# Patient Record
Sex: MALE | Race: WHITE | Employment: OTHER | URBAN - METROPOLITAN AREA
[De-identification: names, ages, dates, MRNs, and addresses within clinical notes are randomized per-mention and may not be internally consistent; named-entity substitution may affect disease eponyms.]

---

## 2019-02-28 LAB
CHOLESTEROL, TOTAL: 152 MG/DL
CHOLESTEROL/HDL RATIO: 0.92
CREATININE: 1 MG/DL
HDLC SERPL-MCNC: 72 MG/DL (ref 35–70)
LDL CHOLESTEROL CALCULATED: 66 MG/DL (ref 0–160)
POTASSIUM (K+): 3.8
TRIGL SERPL-MCNC: 72 MG/DL
VLDLC SERPL CALC-MCNC: ABNORMAL MG/DL

## 2019-05-31 LAB
CHOLESTEROL, TOTAL: 172 MG/DL
CHOLESTEROL/HDL RATIO: 2
CREATININE: 0.9 MG/DL
HDLC SERPL-MCNC: 77 MG/DL (ref 35–70)
LDL CHOLESTEROL CALCULATED: 81 MG/DL (ref 0–160)
POTASSIUM (K+): 4.5
TRIGL SERPL-MCNC: 70 MG/DL
VLDLC SERPL CALC-MCNC: ABNORMAL MG/DL

## 2020-01-30 ENCOUNTER — HOSPITAL ENCOUNTER (EMERGENCY)
Age: 85
Discharge: LEFT BEFORE DISCHARGE | End: 2020-01-30
Attending: EMERGENCY MEDICINE
Payer: OTHER MISCELLANEOUS

## 2020-01-30 ENCOUNTER — APPOINTMENT (OUTPATIENT)
Dept: GENERAL RADIOLOGY | Age: 85
End: 2020-01-30
Payer: OTHER MISCELLANEOUS

## 2020-01-30 VITALS
RESPIRATION RATE: 16 BRPM | OXYGEN SATURATION: 96 % | HEART RATE: 72 BPM | TEMPERATURE: 96.7 F | SYSTOLIC BLOOD PRESSURE: 164 MMHG | DIASTOLIC BLOOD PRESSURE: 88 MMHG

## 2020-01-30 PROCEDURE — 99284 EMERGENCY DEPT VISIT MOD MDM: CPT

## 2020-01-31 NOTE — ED NOTES
Patient decided to sign out AMA, was strongly advised to stay by Dr. Lyndsay Forrester and myself, however the patients vitals are stable and he feels that he wants to leave. Patient is A&O X4, ambulatory, and only c/o minor body aches. Patient was advised to come back if anything worsens.       José Maldonado RN  01/30/20 2038
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
## 2020-02-26 LAB
AVERAGE GLUCOSE: 108
AVERAGE GLUCOSE: 108
CHOLESTEROL, TOTAL: 168 MG/DL
CHOLESTEROL, TOTAL: 168 MG/DL
CHOLESTEROL/HDL RATIO: 2
CHOLESTEROL/HDL RATIO: NORMAL
CREATININE: 0.9 MG/DL
CREATININE: 0.9 MG/DL
HBA1C MFR BLD: 5.4 %
HBA1C MFR BLD: 5.4 %
HDLC SERPL-MCNC: 71 MG/DL (ref 35–70)
HDLC SERPL-MCNC: NORMAL MG/DL
LDL CHOLESTEROL CALCULATED: 90 MG/DL (ref 0–160)
LDL CHOLESTEROL CALCULATED: 90 MG/DL (ref 0–160)
POTASSIUM (K+): 4.5
POTASSIUM (K+): 4.5
PROSTATE SPECIFIC ANTIGEN: 2.5 NG/ML
TRIGL SERPL-MCNC: 34 MG/DL
TRIGL SERPL-MCNC: 34 MG/DL
VLDLC SERPL CALC-MCNC: ABNORMAL MG/DL
VLDLC SERPL CALC-MCNC: NORMAL MG/DL

## 2020-07-10 VITALS
HEART RATE: 56 BPM | DIASTOLIC BLOOD PRESSURE: 84 MMHG | RESPIRATION RATE: 14 BRPM | WEIGHT: 185 LBS | BODY MASS INDEX: 27.4 KG/M2 | HEIGHT: 69 IN | SYSTOLIC BLOOD PRESSURE: 136 MMHG

## 2020-07-10 RX ORDER — LOSARTAN POTASSIUM 100 MG/1
100 TABLET ORAL DAILY
COMMUNITY
End: 2021-03-15 | Stop reason: SDUPTHER

## 2020-10-28 ENCOUNTER — TELEPHONE (OUTPATIENT)
Dept: ADMINISTRATIVE | Age: 85
End: 2020-10-28

## 2020-12-03 ENCOUNTER — TELEPHONE (OUTPATIENT)
Dept: ADMINISTRATIVE | Age: 85
End: 2020-12-03

## 2020-12-03 NOTE — TELEPHONE ENCOUNTER
Pt would like to sched appt for BP check and to talk about his prostate. Please call pt back with more info. Thank you!

## 2020-12-04 NOTE — TELEPHONE ENCOUNTER
Pt called again x2 regarding scheduling an appt, please follow up with him @ [phone].     Thank You So Much!!

## 2021-01-04 ENCOUNTER — TELEPHONE (OUTPATIENT)
Dept: ADMINISTRATIVE | Age: 86
End: 2021-01-04

## 2021-01-04 NOTE — TELEPHONE ENCOUNTER
Pt called stating that he is needing the office to speak with Dr. Mayra Giraldo about his general health for a upcoming dental visit and it is ok to release the information to them. Pt did not have the dentist phone number on hand, but stated the office is on [email or web address] [facility]. Please follow up with patient once the call has been made so the patient does not have to re- schedule his dental appt incase the information is not received in time @ [phone].       Thank You So Much!!        PCP: Brittni Lane MD

## 2021-01-06 NOTE — TELEPHONE ENCOUNTER
Spoke to dr Reynaldo Morris DDS.  He will contact DR Marly Funez at [address] [place] [address] about aneuyrism

## 2021-02-02 ENCOUNTER — TELEPHONE (OUTPATIENT)
Dept: ADMINISTRATIVE | Age: 86
End: 2021-02-02

## 2021-02-02 ENCOUNTER — TELEPHONE (OUTPATIENT)
Dept: FAMILY MEDICINE CLINIC | Age: 86
End: 2021-02-02

## 2021-02-02 NOTE — TELEPHONE ENCOUNTER
Patient called and stated he's been taking his medications wrong. Patient stated he's been taking Metoprolol at 7:00 pm, and his bottle advises to take in the morning. Patient stated he's been taking Losartan in the morning. Patient stated he'd like to cancel BP check appt for 2/3/21. Informed patient that I will send a msg to Dr. Serjio Nelson for advisement, and requested patient wait to cancel this appt until he hears back from the [facility]. Patient was agreeable and stated he is concerned about BP.

## 2021-02-02 NOTE — TELEPHONE ENCOUNTER
Pt called to verify appt. His appt was scheduled for this morning, he thought it was scheduled for tomorrow. He is concerned with his blood pressure because of his age. He said it has been elevated in the mornings and was elevated last week when he was at an appt in [place]. No availability this week to reschedule. Please contact pt.

## 2021-03-01 ENCOUNTER — OFFICE VISIT (OUTPATIENT)
Dept: FAMILY MEDICINE CLINIC | Age: 86
End: 2021-03-01
Payer: MEDICARE

## 2021-03-01 VITALS
SYSTOLIC BLOOD PRESSURE: 160 MMHG | BODY MASS INDEX: 28.14 KG/M2 | HEIGHT: 69 IN | TEMPERATURE: 97.2 F | DIASTOLIC BLOOD PRESSURE: 84 MMHG | HEART RATE: 61 BPM | WEIGHT: 190 LBS | OXYGEN SATURATION: 97 %

## 2021-03-01 DIAGNOSIS — I10 ESSENTIAL HYPERTENSION: Primary | ICD-10-CM

## 2021-03-01 DIAGNOSIS — Z12.5 PROSTATE CANCER SCREENING: ICD-10-CM

## 2021-03-01 DIAGNOSIS — E78.5 HYPERLIPIDEMIA, UNSPECIFIED HYPERLIPIDEMIA TYPE: ICD-10-CM

## 2021-03-01 DIAGNOSIS — R53.83 OTHER FATIGUE: ICD-10-CM

## 2021-03-01 DIAGNOSIS — R73.9 HYPERGLYCEMIA: ICD-10-CM

## 2021-03-01 DIAGNOSIS — Z12.11 COLON CANCER SCREENING: ICD-10-CM

## 2021-03-01 DIAGNOSIS — N52.8 OTHER MALE ERECTILE DYSFUNCTION: ICD-10-CM

## 2021-03-01 PROCEDURE — 99213 OFFICE O/P EST LOW 20 MIN: CPT | Performed by: INTERNAL MEDICINE

## 2021-03-01 RX ORDER — AMLODIPINE BESYLATE 5 MG/1
5 TABLET ORAL DAILY
Qty: 90 TABLET | Refills: 1 | Status: SHIPPED
Start: 2021-03-01 | End: 2021-08-05

## 2021-03-01 SDOH — ECONOMIC STABILITY: TRANSPORTATION INSECURITY
IN THE PAST 12 MONTHS, HAS LACK OF TRANSPORTATION KEPT YOU FROM MEETINGS, WORK, OR FROM GETTING THINGS NEEDED FOR DAILY LIVING?: NO

## 2021-03-01 SDOH — ECONOMIC STABILITY: TRANSPORTATION INSECURITY
IN THE PAST 12 MONTHS, HAS THE LACK OF TRANSPORTATION KEPT YOU FROM MEDICAL APPOINTMENTS OR FROM GETTING MEDICATIONS?: NO

## 2021-03-01 SDOH — ECONOMIC STABILITY: INCOME INSECURITY: HOW HARD IS IT FOR YOU TO PAY FOR THE VERY BASICS LIKE FOOD, HOUSING, MEDICAL CARE, AND HEATING?: NOT ASKED

## 2021-03-01 ASSESSMENT — PATIENT HEALTH QUESTIONNAIRE - PHQ9
2. FEELING DOWN, DEPRESSED OR HOPELESS: 0
SUM OF ALL RESPONSES TO PHQ9 QUESTIONS 1 & 2: 0
SUM OF ALL RESPONSES TO PHQ QUESTIONS 1-9: 0
SUM OF ALL RESPONSES TO PHQ QUESTIONS 1-9: 0
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
## 2021-03-16 RX ORDER — METOPROLOL SUCCINATE 50 MG/1
50 TABLET, EXTENDED RELEASE ORAL DAILY
Qty: 90 TABLET | Refills: 1 | Status: SHIPPED
Start: 2021-03-16 | End: 2021-07-16 | Stop reason: SDUPTHER

## 2021-03-16 RX ORDER — LOSARTAN POTASSIUM 100 MG/1
100 TABLET ORAL DAILY
Qty: 90 TABLET | Refills: 1 | Status: SHIPPED
Start: 2021-03-16 | End: 2021-06-07

## 2021-06-03 DIAGNOSIS — E78.5 HYPERLIPIDEMIA, UNSPECIFIED HYPERLIPIDEMIA TYPE: ICD-10-CM

## 2021-06-03 DIAGNOSIS — R53.83 OTHER FATIGUE: ICD-10-CM

## 2021-06-03 DIAGNOSIS — R73.9 HYPERGLYCEMIA: ICD-10-CM

## 2021-06-03 DIAGNOSIS — Z12.5 PROSTATE CANCER SCREENING: ICD-10-CM

## 2021-06-03 LAB
ALBUMIN SERPL-MCNC: 3.9 G/DL (ref 3.5–5.2)
ALP BLD-CCNC: 35 U/L (ref 40–129)
ALT SERPL-CCNC: 14 U/L (ref 0–40)
ANION GAP SERPL CALCULATED.3IONS-SCNC: 9 MMOL/L (ref 7–16)
AST SERPL-CCNC: 19 U/L (ref 0–39)
BASOPHILS ABSOLUTE: 0.06 E9/L (ref 0–0.2)
BASOPHILS RELATIVE PERCENT: 1 % (ref 0–2)
BILIRUB SERPL-MCNC: 0.5 MG/DL (ref 0–1.2)
BUN BLDV-MCNC: 15 MG/DL (ref 6–23)
CALCIUM SERPL-MCNC: 8.7 MG/DL (ref 8.6–10.2)
CHLORIDE BLD-SCNC: 104 MMOL/L (ref 98–107)
CHOLESTEROL, TOTAL: 166 MG/DL (ref 0–199)
CO2: 25 MMOL/L (ref 22–29)
CREAT SERPL-MCNC: 0.9 MG/DL (ref 0.7–1.2)
EOSINOPHILS ABSOLUTE: 0.33 E9/L (ref 0.05–0.5)
EOSINOPHILS RELATIVE PERCENT: 5.4 % (ref 0–6)
GFR AFRICAN AMERICAN: >60
GFR NON-AFRICAN AMERICAN: >60 ML/MIN/1.73
GLUCOSE BLD-MCNC: 101 MG/DL (ref 74–99)
HBA1C MFR BLD: 5.3 % (ref 4–5.6)
HCT VFR BLD CALC: 42.4 % (ref 37–54)
HDLC SERPL-MCNC: 72 MG/DL
HEMOGLOBIN: 14 G/DL (ref 12.5–16.5)
IMMATURE GRANULOCYTES #: 0.02 E9/L
IMMATURE GRANULOCYTES %: 0.3 % (ref 0–5)
LDL CHOLESTEROL CALCULATED: 84 MG/DL (ref 0–99)
LYMPHOCYTES ABSOLUTE: 0.86 E9/L (ref 1.5–4)
LYMPHOCYTES RELATIVE PERCENT: 14 % (ref 20–42)
MCH RBC QN AUTO: 30.3 PG (ref 26–35)
MCHC RBC AUTO-ENTMCNC: 33 % (ref 32–34.5)
MCV RBC AUTO: 91.8 FL (ref 80–99.9)
MONOCYTES ABSOLUTE: 0.66 E9/L (ref 0.1–0.95)
MONOCYTES RELATIVE PERCENT: 10.7 % (ref 2–12)
NEUTROPHILS ABSOLUTE: 4.22 E9/L (ref 1.8–7.3)
NEUTROPHILS RELATIVE PERCENT: 68.6 % (ref 43–80)
PDW BLD-RTO: 13.5 FL (ref 11.5–15)
PLATELET # BLD: 190 E9/L (ref 130–450)
PMV BLD AUTO: 9.6 FL (ref 7–12)
POTASSIUM SERPL-SCNC: 4.3 MMOL/L (ref 3.5–5)
PROSTATE SPECIFIC ANTIGEN: 2.66 NG/ML (ref 0–4)
RBC # BLD: 4.62 E12/L (ref 3.8–5.8)
SODIUM BLD-SCNC: 138 MMOL/L (ref 132–146)
TOTAL PROTEIN: 6.7 G/DL (ref 6.4–8.3)
TRIGL SERPL-MCNC: 52 MG/DL (ref 0–149)
TSH SERPL DL<=0.05 MIU/L-ACNC: 4.66 UIU/ML (ref 0.27–4.2)
VLDLC SERPL CALC-MCNC: 10 MG/DL
WBC # BLD: 6.2 E9/L (ref 4.5–11.5)

## 2021-06-07 ENCOUNTER — OFFICE VISIT (OUTPATIENT)
Dept: FAMILY MEDICINE CLINIC | Age: 86
End: 2021-06-07
Payer: MEDICARE

## 2021-06-07 VITALS
DIASTOLIC BLOOD PRESSURE: 60 MMHG | WEIGHT: 190 LBS | BODY MASS INDEX: 28.06 KG/M2 | SYSTOLIC BLOOD PRESSURE: 120 MMHG | OXYGEN SATURATION: 98 % | HEART RATE: 62 BPM | TEMPERATURE: 97.3 F

## 2021-06-07 DIAGNOSIS — H35.30 MACULAR DEGENERATION OF LEFT EYE, UNSPECIFIED TYPE: ICD-10-CM

## 2021-06-07 DIAGNOSIS — N52.9 ERECTILE DYSFUNCTION, UNSPECIFIED ERECTILE DYSFUNCTION TYPE: ICD-10-CM

## 2021-06-07 DIAGNOSIS — M54.50 CHRONIC BILATERAL LOW BACK PAIN WITHOUT SCIATICA: ICD-10-CM

## 2021-06-07 DIAGNOSIS — I71.21 ASCENDING AORTIC ANEURYSM: ICD-10-CM

## 2021-06-07 DIAGNOSIS — G89.29 CHRONIC BILATERAL LOW BACK PAIN WITHOUT SCIATICA: ICD-10-CM

## 2021-06-07 DIAGNOSIS — I10 ESSENTIAL HYPERTENSION: Primary | ICD-10-CM

## 2021-06-07 PROCEDURE — 99213 OFFICE O/P EST LOW 20 MIN: CPT | Performed by: INTERNAL MEDICINE
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
## 2021-07-16 RX ORDER — METOPROLOL SUCCINATE 50 MG/1
50 TABLET, EXTENDED RELEASE ORAL DAILY
Qty: 90 TABLET | Refills: 1 | Status: SHIPPED
Start: 2021-07-16 | End: 2021-09-13 | Stop reason: SDUPTHER

## 2021-07-16 NOTE — TELEPHONE ENCOUNTER
----- Message from Adam Patrick sent at 7/15/2021  3:21 PM EDT -----  Subject: Refill Request    QUESTIONS  Name of Medication? metoprolol succinate (TOPROL XL) 50 MG extended   release tablet  Patient-reported dosage and instructions? once a night   How many days do you have left? 28  Preferred Pharmacy? [address] phone number (if available)? [phone]  ---------------------------------------------------------------------------  --------------,  Name of Medication? amLODIPine (NORVASC) 5 MG tablet  Patient-reported dosage and instructions? Once every morning   How many days do you have left? 30  Preferred Pharmacy? [address] phone number (if available)? [phone]  ---------------------------------------------------------------------------  --------------  CALL BACK INFO  What is the best way for the office to contact you? OK to leave message on   voicemail  Preferred Call Back Phone Number?  [phone]

## 2021-08-05 RX ORDER — AMLODIPINE BESYLATE 5 MG/1
TABLET ORAL
Qty: 90 TABLET | Refills: 1 | Status: SHIPPED
Start: 2021-08-05 | End: 2021-09-13 | Stop reason: SDUPTHER

## 2021-08-11 ENCOUNTER — TELEPHONE (OUTPATIENT)
Dept: FAMILY MEDICINE CLINIC | Age: 86
End: 2021-08-11

## 2021-08-11 RX ORDER — SILDENAFIL 100 MG/1
100 TABLET, FILM COATED ORAL DAILY PRN
Qty: 10 TABLET | Refills: 1 | Status: SHIPPED
Start: 2021-08-11 | End: 2021-09-28 | Stop reason: SDUPTHER

## 2021-08-11 NOTE — TELEPHONE ENCOUNTER
----- Message from Whitney Calvert sent at 8/11/2021 11:40 AM EDT -----  Subject: Message to Provider    QUESTIONS  Information for Provider? Patient wondering if both latest RX refill   [facility]. Looks like they have been   sent, but please call [phone] if different. He is going [facility]. Patient also wants Madelyn Carroll, he had that RX from [place] that   he used. Has appt on 9/13.  ---------------------------------------------------------------------------  --------------  CALL BACK INFO  What is the best way for the office to contact you? OK to leave message on   voicemail  Preferred Call Back Phone Number? [phone]  ---------------------------------------------------------------------------  --------------  SCRIPT ANSWERS  Relationship to Patient?  Self

## 2021-09-13 ENCOUNTER — OFFICE VISIT (OUTPATIENT)
Dept: FAMILY MEDICINE CLINIC | Age: 86
End: 2021-09-13
Payer: MEDICARE

## 2021-09-13 VITALS
DIASTOLIC BLOOD PRESSURE: 80 MMHG | TEMPERATURE: 97.5 F | WEIGHT: 191 LBS | OXYGEN SATURATION: 96 % | HEART RATE: 56 BPM | BODY MASS INDEX: 28.21 KG/M2 | SYSTOLIC BLOOD PRESSURE: 132 MMHG

## 2021-09-13 DIAGNOSIS — I71.20 THORACIC AORTIC ANEURYSM WITHOUT RUPTURE: Chronic | ICD-10-CM

## 2021-09-13 DIAGNOSIS — I10 ESSENTIAL HYPERTENSION: Primary | ICD-10-CM

## 2021-09-13 DIAGNOSIS — N52.9 ERECTILE DYSFUNCTION, UNSPECIFIED ERECTILE DYSFUNCTION TYPE: ICD-10-CM

## 2021-09-13 DIAGNOSIS — H35.30 MACULAR DEGENERATION, UNSPECIFIED LATERALITY, UNSPECIFIED TYPE: ICD-10-CM

## 2021-09-13 PROCEDURE — 99213 OFFICE O/P EST LOW 20 MIN: CPT | Performed by: INTERNAL MEDICINE

## 2021-09-13 RX ORDER — AMLODIPINE BESYLATE 5 MG/1
5 TABLET ORAL DAILY
Qty: 90 TABLET | Refills: 1 | Status: SHIPPED | OUTPATIENT
Start: 2021-09-13 | End: 2022-04-28 | Stop reason: SDUPTHER

## 2021-09-13 RX ORDER — METOPROLOL SUCCINATE 50 MG/1
50 TABLET, EXTENDED RELEASE ORAL DAILY
Qty: 90 TABLET | Refills: 1 | Status: SHIPPED | OUTPATIENT
Start: 2021-09-13 | End: 2022-02-14
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
## 2021-09-21 NOTE — TELEPHONE ENCOUNTER
----- Message from Cristiane Mayers sent at 9/21/2021 12:18 PM EDT -----  Subject: Refill Request    QUESTIONS  Name of Medication? sildenafil (VIAGRA) 100 MG tablet  Patient-reported dosage and instructions? Take 1 tablet by mouth daily as   needed for Erectile Dysfunction  How many days do you have left? 0  Preferred Pharmacy? [address] [place] [address]  Pharmacy phone number (if available)? [phone]  Additional Information for Provider? 30 day supply  ---------------------------------------------------------------------------  --------------  CALL BACK INFO  What is the best way for the office to contact you? OK to leave message on   voicemail  Preferred Call Back Phone Number?  [phone]

## 2021-09-28 RX ORDER — SILDENAFIL 100 MG/1
100 TABLET, FILM COATED ORAL DAILY PRN
Qty: 10 TABLET | Refills: 1 | Status: SHIPPED
Start: 2021-09-28 | End: 2022-01-11

## 2021-10-26 ENCOUNTER — TELEPHONE (OUTPATIENT)
Dept: FAMILY MEDICINE CLINIC | Age: 86
End: 2021-10-26

## 2022-01-11 ENCOUNTER — OFFICE VISIT (OUTPATIENT)
Dept: FAMILY MEDICINE CLINIC | Age: 87
End: 2022-01-11
Payer: MEDICARE

## 2022-01-11 VITALS
OXYGEN SATURATION: 98 % | WEIGHT: 196 LBS | DIASTOLIC BLOOD PRESSURE: 80 MMHG | TEMPERATURE: 97.5 F | HEART RATE: 58 BPM | BODY MASS INDEX: 28.94 KG/M2 | SYSTOLIC BLOOD PRESSURE: 130 MMHG

## 2022-01-11 DIAGNOSIS — U07.1 COVID-19: Primary | ICD-10-CM

## 2022-01-11 DIAGNOSIS — N52.9 ERECTILE DYSFUNCTION, UNSPECIFIED ERECTILE DYSFUNCTION TYPE: ICD-10-CM

## 2022-01-11 DIAGNOSIS — I71.20 THORACIC AORTIC ANEURYSM WITHOUT RUPTURE: ICD-10-CM

## 2022-01-11 DIAGNOSIS — I10 ESSENTIAL HYPERTENSION: ICD-10-CM

## 2022-01-11 PROCEDURE — 99213 OFFICE O/P EST LOW 20 MIN: CPT | Performed by: INTERNAL MEDICINE

## 2022-01-11 RX ORDER — SILDENAFIL 100 MG/1
100 TABLET, FILM COATED ORAL SEE ADMIN INSTRUCTIONS
Qty: 30 TABLET | Refills: 1 | Status: SHIPPED | OUTPATIENT
Start: 2022-01-11 | End: 2022-04-12
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
## 2022-01-25 ENCOUNTER — TELEPHONE (OUTPATIENT)
Dept: FAMILY MEDICINE CLINIC | Age: 87
End: 2022-01-25

## 2022-01-25 NOTE — TELEPHONE ENCOUNTER
----- Message from April Elias sent at 1/24/2022  2:56 PM EST -----  Subject: Message to Provider    QUESTIONS  Information for Provider? pt calling as Dr Lynnette Vela suggested a heart    and pt has not heard anything from the office of the heart . please   call pt with information about heart  appointment as well as   information about the urologist that Dr. Lynnette Vela also suggested at the   last appointment, 1/11 Please contact the pt with information about   appointments for these 2 specialists.  ---------------------------------------------------------------------------  --------------  [address]  What is the best way for the office to contact you? OK to leave message on   voicemail  Preferred Call Back Phone Number?  [phone]  ---------------------------------------------------------------------------  --------------  SCRIPT ANSWERS  undefined

## 2022-01-26 NOTE — TELEPHONE ENCOUNTER
Called pt to let him know his date for Urology. He does not want to wait that long it's May 2, 22. I will see if they have waiting list and have him added to that. I made appt for him with Dr. Brenda Woody 2/1/22@ 1:30 to establish care at new pt. Told him to talk to Dr. Brenda Woody about Cardiology referral as there was no referral in the computer for this. Pt was fine with this.      Claudell Right

## 2022-01-31 ENCOUNTER — TELEPHONE (OUTPATIENT)
Dept: FAMILY MEDICINE CLINIC | Age: 87
End: 2022-01-31

## 2022-01-31 NOTE — TELEPHONE ENCOUNTER
----- Message from Erika Eaton sent at 1/31/2022  9:46 AM EST -----  Subject: Referral Request    QUESTIONS   Reason for referral request? pt requested a full work up of blood work and   a call when the blood work order is sent over. Has the physician seen you for this condition before? No   Preferred Specialist (if applicable)? Do you already have an appointment scheduled? No  Additional Information for Provider? pt would like a call when its okay to   come in for labs  ---------------------------------------------------------------------------  --------------  [address]  What is the best way for the office to contact you? OK to leave message on   voicemail  Preferred Call Back Phone Number?  [phone]

## 2022-01-31 NOTE — TELEPHONE ENCOUNTER
Will determine what if any lab work the patient needs when he comes in for his appointment. At this point, I reviewed his lab work which was all appropriate for his hypertension and something that we would typically do on an annual basis. This was a lab work that he had done in June 2021.   Thanks

## 2022-02-14 ENCOUNTER — OFFICE VISIT (OUTPATIENT)
Dept: FAMILY MEDICINE CLINIC | Age: 87
End: 2022-02-14
Payer: MEDICARE

## 2022-02-14 VITALS
TEMPERATURE: 97.8 F | DIASTOLIC BLOOD PRESSURE: 88 MMHG | HEART RATE: 60 BPM | SYSTOLIC BLOOD PRESSURE: 148 MMHG | BODY MASS INDEX: 29.36 KG/M2 | HEIGHT: 69 IN | OXYGEN SATURATION: 99 % | RESPIRATION RATE: 16 BRPM | WEIGHT: 198.2 LBS

## 2022-02-14 DIAGNOSIS — Z96.1 PSEUDOPHAKIA, BOTH EYES: ICD-10-CM

## 2022-02-14 DIAGNOSIS — Z00.01 ENCOUNTER FOR GENERAL ADULT MEDICAL EXAMINATION WITH ABNORMAL FINDINGS: Primary | ICD-10-CM

## 2022-02-14 DIAGNOSIS — I10 PRIMARY HYPERTENSION: ICD-10-CM

## 2022-02-14 DIAGNOSIS — I71.20 THORACIC AORTIC ANEURYSM WITHOUT RUPTURE: ICD-10-CM

## 2022-02-14 DIAGNOSIS — H61.23 HEARING LOSS DUE TO CERUMEN IMPACTION, BILATERAL: ICD-10-CM

## 2022-02-14 DIAGNOSIS — H35.3132 NONEXUDATIVE AGE-RELATED MACULAR DEGENERATION, BILATERAL, INTERMEDIATE DRY STAGE: ICD-10-CM

## 2022-02-14 DIAGNOSIS — F32.0 CURRENT MILD EPISODE OF MAJOR DEPRESSIVE DISORDER, UNSPECIFIED WHETHER RECURRENT (HCC): ICD-10-CM

## 2022-02-14 PROBLEM — H34.8320 BRANCH RETINAL VEIN OCCLUSION OF LEFT EYE WITH MACULAR EDEMA: Status: ACTIVE | Noted: 2021-05-06

## 2022-02-14 PROBLEM — H44.40 HYPOTONY, LEFT EYE: Status: ACTIVE | Noted: 2021-05-06

## 2022-02-14 PROBLEM — N52.9 ED (ERECTILE DYSFUNCTION) OF ORGANIC ORIGIN: Status: ACTIVE | Noted: 2020-06-17

## 2022-02-14 PROCEDURE — 99214 OFFICE O/P EST MOD 30 MIN: CPT | Performed by: SURGERY

## 2022-02-14 PROCEDURE — 69210 REMOVE IMPACTED EAR WAX UNI: CPT | Performed by: SURGERY

## 2022-02-14 RX ORDER — METOPROLOL SUCCINATE 25 MG/1
25 TABLET, EXTENDED RELEASE ORAL DAILY
Qty: 90 TABLET | Refills: 3 | Status: SHIPPED | OUTPATIENT
Start: 2022-02-14

## 2022-02-14 RX ORDER — BUPROPION HYDROCHLORIDE 150 MG/1
150 TABLET ORAL EVERY MORNING
Qty: 30 TABLET | Refills: 0 | Status: SHIPPED
Start: 2022-02-14 | End: 2022-03-15

## 2022-02-14 NOTE — PROGRESS NOTES
Renetta Tolentino (:  10/4/1932) is a 80 y.o. male,Established patient, here for evaluation of the following chief complaint(s):  Establish Care, Ear Fullness (Feels like he has wax in ears.), and Health Maintenance (Refused flu and pneumococcal. Due for tdap, covid vaccine, shingles.)         ASSESSMENT/PLAN:  1. Encounter for general adult medical examination with abnormal findings  Assessment & Plan:        Refuses all vaccinations. DM HM otherwise updated. 2. Thoracic aortic aneurysm without rupture [facility])  Assessment & Plan:        As noted in HPI patient will have likely final surveillance of this. Will probably be released from cardiothoracic surgeon. This thoracic aortic aneurysm is stable. Patient does not smoke. We will continue to keep him in acceptable ranges for his blood pressure given his age. 3. Primary hypertension  Assessment & Plan:        There is guidelines suggest that the patient's blood pressure is within acceptable range for his age. No changes to medications. Counseled patient on heart healthy diet and reducing his sodium intake to 2.5 g or less in any given day. Patient is can to make his best attempt to flip over his packages and read how much sodium he is actually taking in a day. Orders:  -     metoprolol succinate (TOPROL XL) 25 MG extended release tablet; Take 1 tablet by mouth daily, Disp-90 tablet, R-3Normal  4. Pseudophakia, both eyes  Assessment & Plan:      Status post bilateral cataracts with artificial lens. 5. Nonexudative age-related macular degeneration, bilateral, intermediate dry stage  Assessment & Plan:        No changes medications. Continues to follow with ophthalmology. Denies any eye pain or symptoms consistent with uveitis. 6. Current mild episode of major depressive disorder, unspecified whether recurrent [facility])  Assessment & Plan:        Likely confounding the patient's erectile dysfunction. PHQ 9 as noted in HPI.   Through shared decision making, we will pursue treatment with Wellbutrin  mg daily. We will also decrease the patient's metoprolol due to strong inhibition of this and make that 25 mg daily. Reevaluate in 30 days. Orders:  -     buPROPion (WELLBUTRIN XL) 150 MG extended release tablet; Take 1 tablet by mouth every morning, Disp-30 tablet, R-0Normal  7. Hearing loss due to cerumen impaction, bilateral  Assessment & Plan:        Manual debridement under direct visualization bilaterally. Patient tolerated the procedure well. Hearing subjectively improved. Return in about 1 month (around 3/14/2022) for depression f/u htn f/u. Subjective   SUBJECTIVE/OBJECTIVE:  HPI:    HTN: On amlodipine and Toprol-XL. Taking medication without untoward effect. Denies any headaches, changes in vision, syncope, chest discomfort, chest pain, palpitations, shortness of breath, JAMES. At home: SBP zenith is 150 DBP is 80s. Average is 120/70s. Schuyler 100s/70s. He is hypertensive today in the office. Recheck of pressure was 148/88 in right arm. Glaucoma/age-related macular degeneration: Follows with retina specialist as well as ophthalmology. Also had history of cataracts with bilateral implants and resultant pseudophakia. Glaucoma in right eye. COSOPT drops. Thoracic Aortic Aneurysm:    \"I personally reviewed his echocardiogram from today which shows normal ventricular function and a trileaflet aortic valve with trivial aortic valve insufficiency. His chest CTA shows a 4.6 cm ascending aortic maximum dimension. This is effectively unchanged from 2 years prior. \"    And was supposed to have repeat CTA in May of 2022. ED: takes sildenafil. Has a pump. Seems to be more psychogenic in nature. PSYCH:  No data recorded  PHQ9 6 points  Scores 5-9 suggest mild depression which may require only watchful waiting and repeated PHQ-9 at followup. Has never tried any medications before. No therapy, no psych.       Labs (October 2021)  -CBC within normal limits (hemoglobin hematocrit were barely outside of the normal range)  -Comprehensive metabolic panel within normal limits  -Lipid panel from June 2021 is optimal.    General complaint, patient states that his hearing is somewhat decreased over the last month. Preventative:  Health Maintenance   Topic Date Due    COVID-19 Vaccine (1) Never done    Shingles Vaccine (1 of 2) Never done    Pneumococcal 65+ years Vaccine (1 of 1 - PPSV23) Never done    DTaP/Tdap/Td vaccine (1 - Tdap) 02/23/2013    Flu vaccine (1) Never done    Depression Monitoring  03/01/2022    Hepatitis A vaccine  Aged Out    Hepatitis B vaccine  Aged Out    Hib vaccine  Aged Out    Meningococcal (ACWY) vaccine  Aged Out     Refused all vaccinations. ROS:    Denies 10pt ROS other than noted in HPI. Objective       PHYSICAL EXAM:    BP (!) 148/88   Pulse 60   Temp 97.8 °F (36.6 °C) (Temporal)   Resp 16   Ht 5' 9\" (1.753 m)   Wt 198 lb 3.2 oz (89.9 kg)   SpO2 99%   BMI 29.27 kg/m²     AVSS    GA: Well-groomed, appears well, no acute distress. HEENT: Atraumatic normocephalic. Extraocular muscles are grossly intact. Pupils are equal round reactive to light. Conjunctiva pink and moist.  Hearing is grossly intact, has hearing aids bilaterally. EACs are occluded 9% with cerumen. After manual disimpaction with curette and under direct visualization TMs appear with good light reflex and osseous landmarks visible bilaterally. .  Nares patent without external drainage. Buccal mucosa pink and moist.  Posterior oropharynx clear without lesion or exudate. NECK: Trachea is midline, supple, nontender, no lymphadenopathy. CARDIO: Regular rate and rhythm without murmur rub or gallop. Cap refill 2+. Radial pulses 2+ bilaterally. RESPIRATORY: Clear to auscultation bilaterally without wheezes rales or rhonchi. Normal inspiratory and expiratory effort.   Normoxic on room air    ABD: Rounded, normoactive bowel sounds. Soft, nontender, no organomegaly. MSK: Structurally appropriate for age. No gross deficit. NEURO: Alert, no gross deficit. PSYCH:  Mood is normal and congruent with affect. No signs of psychomotor retardation or agitation. Thought content seems normal, speech is fluent and non-pressured. SKIN: Generally warm pink and dry. An electronic signature was used to authenticate this note.     --Umm Rabago, DO

## 2022-02-14 NOTE — PATIENT INSTRUCTIONS
Patient Education        Low Sodium Diet (2,000 Milligram): Care Instructions  Overview     Limiting sodium can be an important part of managing some health problems. The most common source of sodium is salt. People get most of the salt in their diet from canned, prepared, and packaged foods. Fast food and restaurant meals also are very high in sodium. Your doctor will probably limit your sodium to less than 2,000 milligrams (mg) a day. This limit counts all the sodium in prepared and packaged foods and any salt you add to your food. Follow-up care is a key part of your treatment and safety. Be sure to make and go to all appointments, and call your doctor if you are having problems. It's also a good idea to know your test results and keep a list of the medicines you take. How can you care for yourself at home? Read food labels  · Read labels on cans and food packages. The labels tell you how much sodium is in each serving. Make sure that you look at the serving size. If you eat more than the serving size, you have eaten more sodium. · Food labels also tell you the Percent Daily Value for sodium. Choose products with low Percent Daily Values for sodium. · Be aware that sodium can come in forms other than salt, including monosodium glutamate (MSG), sodium citrate, and sodium bicarbonate (baking soda). MSG is often added to Asian food. When you eat out, you can sometimes ask for food without MSG or added salt. Buy low-sodium foods  · Buy foods that are labeled \"unsalted\" (no salt added), \"sodium-free\" (less than 5 mg of sodium per serving), or \"low-sodium\" (140 mg or less of sodium per serving). Foods labeled \"reduced-sodium\" and \"light sodium\" may still have too much sodium. Be sure to read the label to see how much sodium you are getting. · Buy fresh vegetables, or frozen vegetables without added sauces. Buy low-sodium versions of canned vegetables, soups, and other canned goods.   Prepare low-sodium meals  · Cut back on the amount of salt you use in cooking. This will help you adjust to the taste. Do not add salt after cooking. One teaspoon of salt has about 2,300 mg of sodium. · Take the salt shaker off the table. · Flavor your food with garlic, lemon juice, onion, vinegar, herbs, and spices. Do not use soy sauce, lite soy sauce, steak sauce, onion salt, garlic salt, celery salt, or ketchup on your food. · Use low-sodium salad dressings, sauces, and ketchup. Or make your own salad dressings and sauces without adding salt. · Use less salt (or none) when recipes call for it. You can often use half the salt a recipe calls for without losing flavor. Other foods such as rice, pasta, and grains do not need added salt. · Rinse canned vegetables, and cook them in fresh water. This removes some--but not all--of the salt. · Avoid water that is naturally high in sodium or that has been treated with water softeners, which add sodium. If you buy bottled water, read the label and choose a sodium-free brand. Avoid high-sodium foods  · Avoid eating:  ? Smoked, cured, salted, and canned meat, fish, and poultry. ? Ham, brambila, hot dogs, and luncheon meats. ? Regular, hard, and processed cheese and regular peanut butter. ? Crackers with salted tops, and other salted snack foods such as pretzels, chips, and salted popcorn. ? Frozen prepared meals, unless labeled low-sodium. ? Canned and dried soups, broths, and bouillon, unless labeled sodium-free or low-sodium. ? Canned vegetables, unless labeled sodium-free or low-sodium. ? [place] fries, pizza, tacos, and other fast foods. ? Pickles, olives, ketchup, and other condiments, especially soy sauce, unless labeled sodium-free or low-sodium. Where can you learn more? Go to https://piper.healthStumpwise. org and sign in to your [employer] account.  Enter I603 in the [facility] box to learn more about \"Low Sodium Diet (2,000 Milligram): Care Instructions. \"     If you do not have an account, please click on the \"Sign Up Now\" link. Current as of: September 8, 2021               Content Version: 13.1  © 2006-2021 Healthwise, Incorporated. Care instructions adapted under license by [facility] ([facility]). If you have questions about a medical condition or this instruction, always ask your healthcare professional. [address] any warranty or liability for your use of this information. Patient Education        Learning About Cutting Calories  How do calories affect your weight? Food gives your body energy. Energy from the food you eat is measured in calories. This energy keeps your heart beating, your brain active, and your muscles working. Your body needs a certain number of calories each day. After your body uses the calories it needs, it stores extra calories as fat. To lose weight safely, you have to eat fewer calories while eating in a healthy way. How many calories do you need each day? The more active you are, the more calories you need. When you are less active, you need fewer calories. How many calories you need each day also depends on several things, including your age and whether you are male or female. Here are some general guidelines for adults:  · Less active women and older adults need 1,600 to 2,000 calories each day. · Active women and less active men need 2,000 to 2,400 calories each day. · Active men need 2,400 to 3,000 calories each day. How can you cut calories and eat healthy meals? Whole grains, vegetables and fruits, and dried beans are good lower-calorie foods. They give you lots of nutrients and fiber. And they fill you up. Sweets, energy drinks, and soda pop are high in calories. They give you few nutrients and no fiber. Try to limit soda pop, fruit juice, and energy drinks. Drink water instead. Some fats can be part of a healthy diet.  But cutting back on fats from highly processed foods like fast foods and many snack foods is a good way to lower the calories in your diet. Also, use smaller amounts of fats like butter, margarine, salad dressing, and mayonnaise. Add fresh garlic, lemon, or herbs to your meals to add flavor without adding fat. Meats and dairy products can be a big source of hidden fats. Try to choose lean or low-fat versions of these products. Fat-free cookies, candies, chips, and frozen treats can still be high in sugar and calories. Some fat-free foods have more calories than regular ones. Eat fat-free treats in moderation, as you would other foods. If your favorite foods are high in fat, salt, sugar, or calories, limit how often you eat them. Eat smaller servings, or look for healthy substitutes. Fill up on fruits, vegetables, and whole grains. Eating at home  · Use meat as a side dish instead of as the main part of your meal.  · Try main dishes that use whole wheat pasta, brown rice, dried beans, or vegetables. · Find ways to cook with little or no fat, such as broiling, steaming, or grilling. · Use cooking spray instead of oil. If you use oil, use a monounsaturated oil, such as canola or olive oil. · Trim fat from meats before you cook them. · Drain off fat after you brown the meat or while you roast it. · Chill soups and stews after you cook them. Then skim the fat off the top after it hardens. Eating out  · Order foods that are broiled or poached rather than fried or breaded. · Cut back on the amount of butter or margarine that you use on bread. · Order sauces, gravies, and salad dressings on the side, and use only a little. · When you order pasta, choose tomato sauce rather than cream sauce. · Ask for salsa with your baked potato instead of sour cream, butter, cheese, or brambila. · Order meals in a small size instead of upgrading to a large. · Share an entree, or take part of your food home to eat as another meal.  · Share appetizers and desserts.   Where can you learn more?  Go to [email or web address] org and sign in to your [employer] account. Enter O112 in the [facility] box to learn more about \"Learning About Cutting Calories. \"     If you do not have an account, please click on the \"Sign Up Now\" link. Current as of: September 8, 2021               Content Version: 13.1  © 7418-7332 Healthwise, Incorporated. Care instructions adapted under license by [facility] ([facility]). If you have questions about a medical condition or this instruction, always ask your healthcare professional. [place] [address] any warranty or liability for your use of this information.

## 2022-02-15 NOTE — ASSESSMENT & PLAN NOTE
Likely confounding the patient's erectile dysfunction. PHQ 9 as noted in HPI. Through shared decision making, we will pursue treatment with Wellbutrin  mg daily. We will also decrease the patient's metoprolol due to strong inhibition of this and make that 25 mg daily. Reevaluate in 30 days.

## 2022-02-15 NOTE — ASSESSMENT & PLAN NOTE
As noted in HPI patient will have likely final surveillance of this. Will probably be released from cardiothoracic surgeon. This thoracic aortic aneurysm is stable. Patient does not smoke. We will continue to keep him in acceptable ranges for his blood pressure given his age.

## 2022-02-15 NOTE — ASSESSMENT & PLAN NOTE
There is guidelines suggest that the patient's blood pressure is within acceptable range for his age. No changes to medications. Counseled patient on heart healthy diet and reducing his sodium intake to 2.5 g or less in any given day. Patient is can to make his best attempt to flip over his packages and read how much sodium he is actually taking in a day.

## 2022-02-15 NOTE — ASSESSMENT & PLAN NOTE
No changes medications. Continues to follow with ophthalmology. Denies any eye pain or symptoms consistent with uveitis.

## 2022-03-15 ENCOUNTER — OFFICE VISIT (OUTPATIENT)
Dept: FAMILY MEDICINE CLINIC | Age: 87
End: 2022-03-15
Payer: MEDICARE

## 2022-03-15 VITALS
TEMPERATURE: 98.5 F | SYSTOLIC BLOOD PRESSURE: 132 MMHG | OXYGEN SATURATION: 98 % | HEART RATE: 63 BPM | DIASTOLIC BLOOD PRESSURE: 76 MMHG | BODY MASS INDEX: 29.36 KG/M2 | RESPIRATION RATE: 16 BRPM | HEIGHT: 69 IN | WEIGHT: 198.2 LBS

## 2022-03-15 DIAGNOSIS — Z91.14 NONCOMPLIANCE WITH MEDICATION REGIMEN: ICD-10-CM

## 2022-03-15 DIAGNOSIS — N52.9 ED (ERECTILE DYSFUNCTION) OF ORGANIC ORIGIN: Primary | ICD-10-CM

## 2022-03-15 PROCEDURE — 99213 OFFICE O/P EST LOW 20 MIN: CPT | Performed by: SURGERY

## 2022-03-15 PROCEDURE — 3288F FALL RISK ASSESSMENT DOCD: CPT | Performed by: SURGERY

## 2022-03-15 SDOH — ECONOMIC STABILITY: HOUSING INSECURITY
IN THE LAST 12 MONTHS, WAS THERE A TIME WHEN YOU DID NOT HAVE A STEADY PLACE TO SLEEP OR SLEPT IN A SHELTER (INCLUDING NOW)?: NO

## 2022-03-15 SDOH — ECONOMIC STABILITY: FOOD INSECURITY: WITHIN THE PAST 12 MONTHS, THE FOOD YOU BOUGHT JUST DIDN'T LAST AND YOU DIDN'T HAVE MONEY TO GET MORE.: NEVER TRUE

## 2022-03-15 SDOH — ECONOMIC STABILITY: INCOME INSECURITY: IN THE LAST 12 MONTHS, WAS THERE A TIME WHEN YOU WERE NOT ABLE TO PAY THE MORTGAGE OR RENT ON TIME?: NO

## 2022-03-15 SDOH — ECONOMIC STABILITY: FOOD INSECURITY: WITHIN THE PAST 12 MONTHS, YOU WORRIED THAT YOUR FOOD WOULD RUN OUT BEFORE YOU GOT MONEY TO BUY MORE.: NEVER TRUE

## 2022-03-15 ASSESSMENT — PATIENT HEALTH QUESTIONNAIRE - PHQ9
4. FEELING TIRED OR HAVING LITTLE ENERGY: 0
8. MOVING OR SPEAKING SO SLOWLY THAT OTHER PEOPLE COULD HAVE NOTICED. OR THE OPPOSITE, BEING SO FIGETY OR RESTLESS THAT YOU HAVE BEEN MOVING AROUND A LOT MORE THAN USUAL: 0
SUM OF ALL RESPONSES TO PHQ QUESTIONS 1-9: 0
5. POOR APPETITE OR OVEREATING: 0
SUM OF ALL RESPONSES TO PHQ QUESTIONS 1-9: 0
SUM OF ALL RESPONSES TO PHQ QUESTIONS 1-9: 0
1. LITTLE INTEREST OR PLEASURE IN DOING THINGS: 0
SUM OF ALL RESPONSES TO PHQ QUESTIONS 1-9: 0
6. FEELING BAD ABOUT YOURSELF - OR THAT YOU ARE A FAILURE OR HAVE LET YOURSELF OR YOUR FAMILY DOWN: 0
3. TROUBLE FALLING OR STAYING ASLEEP: 0
SUM OF ALL RESPONSES TO PHQ9 QUESTIONS 1 & 2: 0
10. IF YOU CHECKED OFF ANY PROBLEMS, HOW DIFFICULT HAVE THESE PROBLEMS MADE IT FOR YOU TO DO YOUR WORK, TAKE CARE OF THINGS AT HOME, OR GET ALONG WITH OTHER PEOPLE: 0
2. FEELING DOWN, DEPRESSED OR HOPELESS: 0
7. TROUBLE CONCENTRATING ON THINGS, SUCH AS READING THE NEWSPAPER OR WATCHING TELEVISION: 0
9. THOUGHTS THAT YOU WOULD BE BETTER OFF DEAD, OR OF HURTING YOURSELF: 0

## 2022-03-15 ASSESSMENT — SOCIAL DETERMINANTS OF HEALTH (SDOH): HOW HARD IS IT FOR YOU TO PAY FOR THE VERY BASICS LIKE FOOD, HOUSING, MEDICAL CARE, AND HEATING?: NOT HARD AT ALL

## 2022-03-15 NOTE — PROGRESS NOTES
Norris Eric (:  10/4/1932) is a 80 y.o. 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 Noncompliance with medication regimen    Stone unlikely organic in origin the patient does have risk factors for peripheral vascular disease, hypertension. The fact that he still to masses in the morning and his depression score is gone down while his sexual function has improved seems to indicate that this is more psychogenic in nature. Patient will not take the medication. Will consider taking this again in the future should his performance decline. Return in about 6 months (around 9/15/2022) for AWV (if due) otherwise for chronic conditions f/u. Subjective   SUBJECTIVE/OBJECTIVE:  HPI:    ED: takes sildenafil. Has a pump. Seems to be more psychogenic in nature. Was recommended to address psychiatric issues:    PHQ9 was 6. Now is 0. PHQ-9 Total Score: 0 (3/15/2022 12:11 PM)  Thoughts that you would be better off dead, or of hurting yourself in some way: 0 (3/15/2022 12:11 PM)    Did not take medication as directed. Will not take it at this point. Relates that his sexual health and life have improved since our last visit. Preventative:  Health Maintenance   Topic Date Due    COVID-19 Vaccine (1) Never done    Depression Monitoring  Never done    Shingles Vaccine (1 of 2) Never done    Pneumococcal 65+ years Vaccine (1 of 1 - PPSV23) Never done    DTaP/Tdap/Td vaccine (1 - Tdap) 2013    Flu vaccine (1) Never done    Hepatitis A vaccine  Aged Out    Hepatitis B vaccine  Aged Out    Hib vaccine  Aged Out    Meningococcal (ACWY) vaccine  Aged Out     Refused all vaccinations. ROS:    Denies 10pt ROS other than noted in HPI.          Objective       PHYSICAL EXAM:    /76 (Site: Right Upper Arm)   Pulse 63   Temp 98.5 °F (36.9 °C) (Temporal)   Resp 16   Ht 5' 9\" (1.753 m)   Wt 198 lb 3.2 oz (89.9 kg)   SpO2 98%   BMI 29.27 kg/m²     AVSS    GA: Well-groomed, appears well, no acute distress. HEENT: Atraumatic normocephalic. Extraocular muscles are grossly intact. Pupils are equal round reactive to light. Conjunctiva pink and moist.  Hearing is grossly intact, has hearing aids bilaterally. Nares patent without external drainage. Buccal mucosa pink and moist.  Posterior oropharynx clear without lesion or exudate. NECK: Trachea is midline, supple, nontender, no lymphadenopathy. CARDIO: Regular rate and rhythm without murmur rub or gallop. Cap refill 2+. Radial pulses 2+ bilaterally. RESPIRATORY: Clear to auscultation bilaterally without wheezes rales or rhonchi. Normal inspiratory and expiratory effort. Normoxic on room air    MSK: Structurally appropriate for age. No gross deficit. NEURO: Alert, no gross deficit. PSYCH:  Mood is normal and congruent with affect. No signs of psychomotor retardation or agitation. Thought content seems normal, speech is fluent and non-pressured. SKIN: Generally warm pink and dry. An electronic signature was used to authenticate this note.     --Sarah Caro,

## 2022-03-16 PROBLEM — Z00.01 ENCOUNTER FOR GENERAL ADULT MEDICAL EXAMINATION WITH ABNORMAL FINDINGS: Status: RESOLVED | Noted: 2022-02-14 | Resolved: 2022-03-16

## 2022-03-29 ENCOUNTER — OFFICE VISIT (OUTPATIENT)
Dept: FAMILY MEDICINE CLINIC | Age: 87
End: 2022-03-29
Payer: MEDICARE

## 2022-03-29 VITALS
TEMPERATURE: 97.3 F | HEART RATE: 67 BPM | SYSTOLIC BLOOD PRESSURE: 132 MMHG | OXYGEN SATURATION: 96 % | BODY MASS INDEX: 29.44 KG/M2 | RESPIRATION RATE: 16 BRPM | DIASTOLIC BLOOD PRESSURE: 86 MMHG | HEIGHT: 69 IN | WEIGHT: 198.8 LBS

## 2022-03-29 DIAGNOSIS — K21.9 GASTROESOPHAGEAL REFLUX DISEASE WITHOUT ESOPHAGITIS: ICD-10-CM

## 2022-03-29 DIAGNOSIS — J06.9 VIRAL URI: ICD-10-CM

## 2022-03-29 DIAGNOSIS — R05.9 COUGH: ICD-10-CM

## 2022-03-29 DIAGNOSIS — M31.6 TEMPORAL ARTERITIS (HCC): ICD-10-CM

## 2022-03-29 DIAGNOSIS — Z20.822 SUSPECTED COVID-19 VIRUS INFECTION: ICD-10-CM

## 2022-03-29 DIAGNOSIS — R51.9 ACUTE NONINTRACTABLE HEADACHE, UNSPECIFIED HEADACHE TYPE: Primary | ICD-10-CM

## 2022-03-29 DIAGNOSIS — R51.9 ACUTE NONINTRACTABLE HEADACHE, UNSPECIFIED HEADACHE TYPE: ICD-10-CM

## 2022-03-29 LAB
ALBUMIN SERPL-MCNC: 4.1 G/DL (ref 3.5–5.2)
ALP BLD-CCNC: 45 U/L (ref 40–129)
ALT SERPL-CCNC: 19 U/L (ref 0–40)
ANION GAP SERPL CALCULATED.3IONS-SCNC: 20 MMOL/L (ref 7–16)
AST SERPL-CCNC: 23 U/L (ref 0–39)
BASOPHILS ABSOLUTE: 0.06 E9/L (ref 0–0.2)
BASOPHILS RELATIVE PERCENT: 0.9 % (ref 0–2)
BILIRUB SERPL-MCNC: 0.4 MG/DL (ref 0–1.2)
BUN BLDV-MCNC: 16 MG/DL (ref 6–23)
C-REACTIVE PROTEIN: 3.3 MG/DL (ref 0–0.4)
CALCIUM SERPL-MCNC: 9.3 MG/DL (ref 8.6–10.2)
CHLORIDE BLD-SCNC: 104 MMOL/L (ref 98–107)
CO2: 20 MMOL/L (ref 22–29)
CREAT SERPL-MCNC: 1 MG/DL (ref 0.7–1.2)
EOSINOPHILS ABSOLUTE: 0.16 E9/L (ref 0.05–0.5)
EOSINOPHILS RELATIVE PERCENT: 2.3 % (ref 0–6)
GFR AFRICAN AMERICAN: >60
GFR NON-AFRICAN AMERICAN: >60 ML/MIN/1.73
GLUCOSE BLD-MCNC: 94 MG/DL (ref 74–99)
HCT VFR BLD CALC: 42.2 % (ref 37–54)
HEMOGLOBIN: 14.2 G/DL (ref 12.5–16.5)
IMMATURE GRANULOCYTES #: 0.03 E9/L
IMMATURE GRANULOCYTES %: 0.4 % (ref 0–5)
LYMPHOCYTES ABSOLUTE: 0.97 E9/L (ref 1.5–4)
LYMPHOCYTES RELATIVE PERCENT: 13.9 % (ref 20–42)
Lab: NORMAL
MCH RBC QN AUTO: 30.5 PG (ref 26–35)
MCHC RBC AUTO-ENTMCNC: 33.6 % (ref 32–34.5)
MCV RBC AUTO: 90.8 FL (ref 80–99.9)
MONOCYTES ABSOLUTE: 0.85 E9/L (ref 0.1–0.95)
MONOCYTES RELATIVE PERCENT: 12.1 % (ref 2–12)
NEUTROPHILS ABSOLUTE: 4.93 E9/L (ref 1.8–7.3)
NEUTROPHILS RELATIVE PERCENT: 70.4 % (ref 43–80)
PDW BLD-RTO: 12.9 FL (ref 11.5–15)
PERFORMING INSTRUMENT: NORMAL
PLATELET # BLD: 269 E9/L (ref 130–450)
PMV BLD AUTO: 9.7 FL (ref 7–12)
POTASSIUM SERPL-SCNC: 4.9 MMOL/L (ref 3.5–5)
QC PASS/FAIL: NORMAL
RBC # BLD: 4.65 E12/L (ref 3.8–5.8)
SARS-COV-2, POC: NORMAL
SEDIMENTATION RATE, ERYTHROCYTE: 42 MM/HR (ref 0–15)
SODIUM BLD-SCNC: 144 MMOL/L (ref 132–146)
TOTAL PROTEIN: 7.9 G/DL (ref 6.4–8.3)
WBC # BLD: 7 E9/L (ref 4.5–11.5)

## 2022-03-29 PROCEDURE — 99214 OFFICE O/P EST MOD 30 MIN: CPT | Performed by: SURGERY

## 2022-03-29 PROCEDURE — 87426 SARSCOV CORONAVIRUS AG IA: CPT | Performed by: SURGERY

## 2022-03-29 RX ORDER — PREDNISONE 10 MG/1
50 TABLET ORAL DAILY
Qty: 70 TABLET | Refills: 0 | Status: SHIPPED
Start: 2022-03-29 | End: 2022-05-05 | Stop reason: SDUPTHER

## 2022-03-29 RX ORDER — PHENOL 1.4 %
AEROSOL, SPRAY (ML) MUCOUS MEMBRANE
Qty: 60 TABLET | Refills: 3 | Status: SHIPPED | OUTPATIENT
Start: 2022-03-29

## 2022-03-29 RX ORDER — IBUPROFEN 800 MG/1
800 TABLET ORAL EVERY 8 HOURS
Qty: 9 TABLET | Refills: 0 | Status: SHIPPED
Start: 2022-03-29 | End: 2022-04-15

## 2022-03-29 RX ORDER — GUAIFENESIN 600 MG/1
600 TABLET, EXTENDED RELEASE ORAL 2 TIMES DAILY
Qty: 30 TABLET | Refills: 0 | Status: SHIPPED | OUTPATIENT
Start: 2022-03-29 | End: 2022-04-13

## 2022-03-29 RX ORDER — OXYMETAZOLINE HYDROCHLORIDE 0.05 G/100ML
2 SPRAY NASAL
Qty: 1 EACH | Refills: 3 | Status: SHIPPED | OUTPATIENT
Start: 2022-03-29 | End: 2022-04-01

## 2022-03-29 RX ORDER — MELATONIN
Qty: 60 TABLET | Refills: 5 | Status: SHIPPED
Start: 2022-03-29 | End: 2022-06-07 | Stop reason: SDUPTHER

## 2022-03-29 NOTE — PROGRESS NOTES
Tina Bhardwaj (:  10/4/1932) is a 80 y.o. male,Established patient, here for evaluation of the following chief complaint(s):  Cough (Dry cough, comes and goes for about a week) and Headache         ASSESSMENT/PLAN:  1. Acute nonintractable headache, unspecified headache type  -Patient age of 48 years or older  -New onset of localized headache  -Temporal artery tenderness with decreased temporal artery pulse on the right  -Erythrocyte sedimentation rate of 50 mm per hour or greater is typical criteria, patient has ESR of 42 (upper limit of normal for his age is 21, even with 20% increase due to age, this is elevated significantly). Though just shy of the diagnostic threshold of 50, there are many care reports of bx confirm GCA with ESR under 30. Starting dosage    Prednisone in a dosage of 40 to 60 mg per day until the ESR is normal and the patient is asymptomatic    Dosage reduction    Decrease the dosage by 10 percent every 2 weeks to a dosage of 10 mg per day; then decrease the dosage by 1 mg per day every 4 weeks. Monitoring    Monitor the patient for symptom recurrence throughout the steroid taper; monitor the ESR every 4 weeks for 2 to 3 months, then every 8 to 12 weeks until 12 to 18 months after the cessation of therapy. If we choose to due steroids pending above Supplementation of calcium (calcium carbonate in a dosage of 1 to 1.5 g per day) and vitamin D (400 to 800 IU per day) may decrease the risk of osteoporosis and subsequent fractures in patients who are being treated with corticosteroids. EMERALD rule, patients with any of the following measurements should receive imaging:  -Systolic blood pressure > 286 mm Hg  -Diastolic blood pressure > 90 mm Hg  -Blood glucose level > 115 mg per dL (6.4 mmol per L)  -Serum potassium level < 3.9 mEq per L (3.9 mmol per L)    No CT head, non focal exam, EMERALD as above.      2. 4. 5. Cough/ViralURI/COVID  -Covid test is negative  -Exam as noted and appears to be some type of viral respiratory infection. Continue Mucinex twice daily, make sure you stay plenty hydrated with at least 1.5 L of fluid per day, temporary use of oxymetazoline and ibuprofen to help open up the ostiomeatal complex. If no improvement in 3 days patient will be about 10 days with symptoms and will reevaluate and determine whether or not antibiotic is appropriate. 3. GERD  -Symptoms are too sporadic to explain the patient's cough. Only once per week. It is possible that the patient could be a silent reflux or in having some type of laryngoesophageal reflux or spasm causing him to have a cough. If maximally treating his upper respiratory symptoms that are witnessed on exam and by history does not improve anything then we will consider doing a PPI therapy for 2 months. No follow-ups on file. Subjective   SUBJECTIVE/OBJECTIVE:  HPI:    Headache: over past week, right temporal area, described as \"just hurts. \"  Not described as the worst headache of his life or onset like align or clap. Did bump his head into the corner of a cabinet but had no ecchymosis or pain or any other prodromal symptoms such as dizziness or syncope. No sinus congestion, pressure or fullness, no rhinorrhea,  No post nasal drip. Has nonproductive dry cough over same period of time. Further probing of the patient he actually admits to having rhinorrhea over the past two weeks with improvement but still denies sinus congestion/pressure/fullness, no ear symptoms such as popping/crackling/fullness/decreased hearing/tinnitus. No visual disturbance. No proximal muscle weakness. GERD:  Getting reflux symptoms at least weekly, he notices some dietary indiscretions cause this. Has associated cough during the day, not at night. Cannot associate postprandial.  Cough is improving.       Preventative:  Health Maintenance   Topic Date Due    COVID-19 Vaccine (1) Never done    Shingles Vaccine (1 of 2) Never done    Pneumococcal 65+ years Vaccine (1 of 1 - PPSV23) Never done    DTaP/Tdap/Td vaccine (1 - Tdap) 02/23/2013    Flu vaccine (1) Never done    Depression Monitoring  03/15/2023    Hepatitis A vaccine  Aged Out    Hepatitis B vaccine  Aged Out    Hib vaccine  Aged Out    Meningococcal (ACWY) vaccine  Aged Out           ROS:    Denies 10pt ROS other than noted in HPI. Objective       PHYSICAL EXAM:    /86   Pulse 67   Temp 97.3 °F (36.3 °C) (Temporal)   Resp 16   Ht 5' 9\" (1.753 m)   Wt 198 lb 12.8 oz (90.2 kg)   SpO2 96%   BMI 29.36 kg/m²     AVSS    GA: Well-groomed, appears well, no acute distress. HEENT: Atraumatic normocephalic. Extraocular muscles are intact without focal deficit. Pupils are equal round reactive to light. Conjunctiva pink and moist.  Hearing is grossly intact. Nares patent without external drainage. Inferior turbinates are boggy, erythematous, thin clear posterior rhinorrhea draining bilaterally along the inferior meatus. Buccal mucosa pink and moist. Posterior rhinorrhea, otherwise posterior oropharynx clear without lesion or exudate. NECK: Trachea is midline, supple, nontender, no lymphadenopathy. CARDIO: Regular rate and rhythm, normal S1/S2. Cap refill 2+. Radial pulses 2+ bilaterally. 1+ temporal pulse on right when compared to the left. RESPIRATORY: Clear to auscultation bilaterally without wheezes rales or rhonchi. Normal inspiratory and expiratory effort. Normoxic on room air    ABD: Rounded, normoactive bowel sounds. Soft, nontender, no organomegaly. MSK: Structurally appropriate for age. No gross deficit. MSR 5-/5 bilateral upper and lower extremities - there is no evidence of proximal muscle weakness. NEURO: Alert, no gross deficit. CN II to XII intact without focal deficit. PSYCH:  Mood is normal and congruent with affect. No signs of psychomotor retardation or agitation.   Thought content seems normal, speech is fluent and non-pressured. SKIN: Generally warm pink and dry. There is tenderness with palpation in the right temporal area. An electronic signature was used to authenticate this note.     --Tito Apontey, DO

## 2022-03-30 ENCOUNTER — OFFICE VISIT (OUTPATIENT)
Dept: FAMILY MEDICINE CLINIC | Age: 87
End: 2022-03-30
Payer: MEDICARE

## 2022-03-30 VITALS
RESPIRATION RATE: 16 BRPM | HEIGHT: 69 IN | TEMPERATURE: 97.4 F | OXYGEN SATURATION: 97 % | DIASTOLIC BLOOD PRESSURE: 82 MMHG | SYSTOLIC BLOOD PRESSURE: 144 MMHG | HEART RATE: 79 BPM | BODY MASS INDEX: 29.33 KG/M2 | WEIGHT: 198 LBS

## 2022-03-30 DIAGNOSIS — M54.50 CHRONIC BILATERAL LOW BACK PAIN WITHOUT SCIATICA: ICD-10-CM

## 2022-03-30 DIAGNOSIS — G89.29 CHRONIC BILATERAL LOW BACK PAIN WITHOUT SCIATICA: ICD-10-CM

## 2022-03-30 DIAGNOSIS — M31.6 TEMPORAL ARTERITIS (HCC): Primary | ICD-10-CM

## 2022-03-30 PROCEDURE — 99214 OFFICE O/P EST MOD 30 MIN: CPT | Performed by: SURGERY

## 2022-03-30 NOTE — PATIENT INSTRUCTIONS
Starting dosage    Prednisone in a dosage of 40 to 60 mg per day until the ESR is normal and the patient is asymptomatic    Dosage reduction    Decrease the dosage by 10 percent every 2 weeks to a dosage of 10 mg per day; then decrease the dosage by 1 mg per day every 4 weeks. Monitoring    Monitor the patient for symptom recurrence throughout the steroid taper; monitor the ESR every 4 weeks for 2 to 3 months, then every 8 to 12 weeks until 12 to 18 months after the cessation of therapy. If we choose to due steroids pending above Supplementation of calcium (calcium carbonate in a dosage of 1 to 1.5 g per day) and vitamin D (400 to 800 IU per day) may decrease the risk of osteoporosis and subsequent fractures in patients who are being treated with corticosteroids. Patient Education        Temporal Arteritis: Care Instructions  Your Care Instructions     Temporal arteritis is an inflammation of blood vessels leading to your head and eyes. It usually affects people older than 50. It is more common in women. Thiscondition is also called giant cell arteritis. Temporal arteritis causes a dull, throbbing headache on one side of the head around the eye or near the temple. Sometimes the pain feels like stabbing orburning. It may also cause jaw pain and vision loss. Temporal arteritis is treated right away to prevent blindness or stroke. Your doctor will prescribe steroids that you take as pills. The steroids can also be given to you through a needle in your vein. Most symptoms should get better quickly, usually in 1 to 3 days. But if you have vision loss, it isn't likely to improve with treatment. You may need to take medicine for more than 2 yearsto prevent problems. Follow-up care is a key part of your treatment and safety. Be sure to make and go to all appointments, and call your doctor if you are having problems.  It's also a good idea to know your test results and keep alist of the medicines you take.  How can you care for yourself at home?  Take your medicines exactly as prescribed. Call your doctor if you have any problems with your medicine.  Take your steroid in the morning with food unless your doctor tells you otherwise.  Eat a balanced diet.  If you are on long-term steroids, take a daily vitamin containing calcium and vitamin D. This can prevent bone thinning caused by the steroids.  Get regular, gentle exercise to keep your bones strong and prevent bone loss. Walking is a good choice. Exercise can also help you cope with the illness.  Tell any health professional that cares for you that you are taking steroids. You may want to wear medical alert jewelry that lists this medicine. You can buy this at most drugstores. When should you call for help? Call 911 anytime you think you may need emergency care. For example, call if:     You have twitching, jerking, or a seizure.      You passed out (lost consciousness).      You have symptoms of a stroke. These may include:  ? Sudden numbness, tingling, weakness, or loss of movement in your face, arm, or leg, especially on only one side of your body. ? Sudden vision changes. ? Sudden trouble speaking. ? Sudden confusion or trouble understanding simple statements. ? Sudden problems with walking or balance. ? A sudden, severe headache that is different from past headaches. Call your doctor now or seek immediate medical care if:     Your symptoms start to return.      You get new headaches.      You have nausea or heartburn.      You have side effects of your steroid medicine, such as:  ? Weight gain. ? Mood changes. ? Trouble sleeping. ? Bruising easily. Watch closely for changes in your health, and be sure to contact your doctor if:     You do not get better as expected. Where can you learn more? Go to https://piper.[employer]. org and sign in to your [employer] account.  Enter Q338 in the [facility] box to learn more about \"Temporal Arteritis: Care Instructions. \"     If you do not have an account, please click on the \"Sign Up Now\" link. Current as of: December 20, 2021               Content Version: 13.2  © 2006-2022 [employer]. Care instructions adapted under license by [facility] ([facility]). If you have questions about a medical condition or this instruction, always ask your healthcare professional. [place] [address] any warranty or liability for your use of this information. Patient Education        Learning About Temporal Artery Biopsy  What is it? The temporal artery is a blood vessel on the side of your forehead. A temporal artery biopsy is a procedure to remove a section of the artery for testing. It is used to diagnose an inflammation of the blood vessels called giant cell arteritis (GCA). The sample of your artery is sent to a lab. A doctor therewill look closely at the artery wall under a microscope for signs of GCA. GCA mostly affects the arteries that carry blood to the head. It's also calledtemporal arteritis. That's because it often affects the artery in the temple. Blood tests and imaging tests (like an MRI or ultrasound) can also helpdiagnose GCA. But a temporal artery biopsy is the most accurate test.  How is it done? The doctor will numb the area of your temple where the biopsy will be done. Then he or she will make a cut (incision) along your temple and find the artery. Your doctor will remove a small section of the artery. Then he or she will tie off the ends that remain. This means that the artery will no longer carry blood. But that's okay, because the other blood vessels in the area will carrythe blood instead. After the artery sample is taken, the incision will be closed with stitches and covered with a bandage. You may have the procedure on one or both sides of yourhead.  It depends on what your doctor thinks is best.  What happens after the test?  You will probably be able to go home soon after the procedure. You will have a dressing over the cut (incision). A dressing helps the incision heal andprotects it. Your doctor will tell you how to take care of this. It is important to continue all medicines prescribed by your doctor to reduceinflammation while you wait for the biopsy results. You may have to return to have your stitches removed. If so, your doctor Meghan Shove you when you need to come back. The results of your biopsy will be ready in a few days. But your doctor won't delay treatment while waiting for the results. If your results show no signs of inflammation but your symptoms strongly suggest giant cell arteritis, you and your doctor will discuss the risks and benefits of treatment and of no treatment. Your doctor may suggest a second biopsy. This depends on yoursymptoms. Follow-up care is a key part of your treatment and safety. Be sure to make and go to all appointments, and call your doctor if you are having problems. It's also a good idea to know your test results and keep alist of the medicines you take. Where can you learn more? Go to https://ShopSociallypeTimeet.PeekYou. org and sign in to your [employer] account. Enter T125 in the [facility] box to learn more about \"Learning About Temporal Artery Biopsy. \"     If you do not have an account, please click on the \"Sign Up Now\" link. Current as of: December 20, 2021               Content Version: 13.2  © 0588-7168 [employer]. Care instructions adapted under license by [facility] [address] If you have questions about a medical condition or this instruction, always ask your healthcare professional. [place] [address] any warranty or liability for your use of this information.

## 2022-03-30 NOTE — PROGRESS NOTES
Marimar Sales (:  10/4/1932) is a 80 y.o. male,Established patient, here for evaluation of the following chief complaint(s):  Discuss Labs (Patient would like to discuss labs, medication) and Health Maintenance (Due for covid, shingle , pneumococcal, Tdap, Flu)         ASSESSMENT/PLAN:  1. Temporal arteritis  -Patient age of 48 years or older  -New onset of localized headache  -Temporal artery tenderness with decreased temporal artery pulse on the right  -Erythrocyte sedimentation rate of 50 mm per hour or greater is typical criteria, patient has ESR of 42 (upper limit of normal for his age is 21, even with 20% increase due to age, this is elevated significantly). Though just shy of the diagnostic threshold of 50, there are many care reports of bx confirm GCA with ESR under 30. Starting dosage    Prednisone in a dosage of 40 to 60 mg per day until the ESR is normal and the patient is asymptomatic    Dosage reduction    Decrease the dosage by 10 percent every 2 weeks to a dosage of 10 mg per day; then decrease the dosage by 1 mg per day every 4 weeks. Monitoring    Monitor the patient for symptom recurrence throughout the steroid taper; monitor the ESR every 4 weeks for 2 to 3 months, then every 8 to 12 weeks until 12 to 18 months after the cessation of therapy. If we choose to due steroids pending above Supplementation of calcium (calcium carbonate in a dosage of 1 to 1.5 g per day) and vitamin D (400 to 800 IU per day) may decrease the risk of osteoporosis and subsequent fractures in patients who are being treated with corticosteroids. EMERALD rule, patients with any of the following measurements should receive imaging:  -Systolic blood pressure > 429 mm Hg  -Diastolic blood pressure > 90 mm Hg  -Blood glucose level > 115 mg per dL (6.4 mmol per L)  -Serum potassium level < 3.9 mEq per L (3.9 mmol per L)    No CT head, non focal exam, EMERALD as above. No follow-ups on file. Subjective   SUBJECTIVE/OBJECTIVE:  HPI:    Headache: over past week, right temporal area, described as \"just hurts. \"  Not described as the worst headache of his life or onset like align or clap. Did bump his head into the corner of a cabinet but had no ecchymosis or pain or any other prodromal symptoms such as dizziness or syncope. No sinus congestion, pressure or fullness, no rhinorrhea,  No post nasal drip. Has nonproductive dry cough over same period of time. Further probing of the patient he actually admits to having rhinorrhea over the past two weeks with improvement but still denies sinus congestion/pressure/fullness, no ear symptoms such as popping/crackling/fullness/decreased hearing/tinnitus. No visual disturbance. No proximal muscle weakness. ROS:    Denies 10pt ROS other than noted in HPI. Objective       PHYSICAL EXAM:    BP (!) 144/82   Pulse 79   Temp 97.4 °F (36.3 °C) (Temporal)   Resp 16   Ht 5' 9\" (1.753 m)   Wt 198 lb (89.8 kg)   SpO2 97%   BMI 29.24 kg/m²     AVSS    GA: Well-groomed, appears well, no acute distress. HEENT: Atraumatic normocephalic. Extraocular muscles are intact without focal deficit. Pupils are equal round reactive to light. Conjunctiva pink and moist.  Hearing is grossly intact. NECK: Trachea is midline, supple, nontender, no lymphadenopathy. CARDIO: Regular rate and rhythm, normal S1/S2. Cap refill 2+. Radial pulses 2+ bilaterally. 1+ temporal pulse on right when compared to the left. RESPIRATORY: Clear to auscultation bilaterally without wheezes rales or rhonchi. Normal inspiratory and expiratory effort. Normoxic on room air    MSK: Structurally appropriate for age. No gross deficit. NEURO: Alert, no gross deficit. CN II to XII intact without focal deficit. PSYCH:  Mood is normal and congruent with affect. No signs of psychomotor retardation or agitation.   Thought content seems normal, speech is fluent and non-pressured. SKIN: Generally warm pink and dry. There is tenderness with palpation in the right temporal area. An electronic signature was used to authenticate this note.     --Oliva Sykes, DO

## 2022-03-31 ENCOUNTER — TELEPHONE (OUTPATIENT)
Dept: FAMILY MEDICINE CLINIC | Age: 87
End: 2022-03-31

## 2022-03-31 NOTE — TELEPHONE ENCOUNTER
Pt called to inform he took Zinc, vitamin D, went to bible class, acquired a headache. States it is subsiding now, he feels good otherwise for an 89yo. He is questioning if the steroid caused the headache. He states he is a survivor and will not give up.

## 2022-03-31 NOTE — TELEPHONE ENCOUNTER
Received a message from [facility] that they do not see patients with Temporal Arteritis and are recommending the pt be referred to vascular surgery.

## 2022-03-31 NOTE — TELEPHONE ENCOUNTER
He has a headache because of temporal arteritis, it will take a few days to get the inflammation down to lessen the headaches. As long as it is not thunderclap, flashes of light, or worst headache of his life [all should make him go to ED/911 by the way] it is okay. Take the prednisone on a full stomach and be sure to drink at least eight 8oz glasses of water daily.

## 2022-03-31 NOTE — TELEPHONE ENCOUNTER
----- Message from Christopher Washington sent at 3/31/2022  1:38 PM EDT -----  Subject: Message to Provider    QUESTIONS  Information for Provider? Patient states he experienced a headache this   morning after he took a steroid this morning didn't know exact medicine it   was, and want to know if that was a side effect and how should he proceed   . He didnt want to book an appt.  ---------------------------------------------------------------------------  --------------  CALL BACK INFO  What is the best way for the office to contact you? OK to leave message on   voicemail  Preferred Call Back Phone Number? [phone]  ---------------------------------------------------------------------------  --------------  SCRIPT ANSWERS  Relationship to Patient?  Self

## 2022-03-31 NOTE — TELEPHONE ENCOUNTER
ED Time Seen By Provider Entered On:  4/20/2017 11:09     Performed On:  4/20/2017 11:09  by Gamaliel Coffman DO      Time Seen By Provider   Time Seen by Provider :   4/20/2017 11:09    Gamaliel Coffman DO - 4/20/2017 11:09            Left  msg requesting return call.

## 2022-04-01 DIAGNOSIS — M31.6 TEMPORAL ARTERITIS (HCC): Primary | ICD-10-CM

## 2022-04-07 ENCOUNTER — TELEPHONE (OUTPATIENT)
Dept: FAMILY MEDICINE CLINIC | Age: 87
End: 2022-04-07

## 2022-04-07 NOTE — TELEPHONE ENCOUNTER
Patient called to confirm appt with Dr. Lamine Ford and was concerned he was going to do a biopsy. I explained the first visit is a consultation.    Last seen 3/30/2022  Next appt 5/4/2022

## 2022-04-11 ENCOUNTER — TELEPHONE (OUTPATIENT)
Dept: VASCULAR SURGERY | Age: 87
End: 2022-04-11

## 2022-04-11 ENCOUNTER — TELEPHONE (OUTPATIENT)
Dept: FAMILY MEDICINE CLINIC | Age: 87
End: 2022-04-11

## 2022-04-11 NOTE — TELEPHONE ENCOUNTER
----- Message from Efra Burgess sent at 4/11/2022 10:44 AM EDT -----  Subject: Message to Provider    QUESTIONS  Information for Provider? Pt called and wanted to speak to Janee Guzman in the   office regarding his medications.   ---------------------------------------------------------------------------  --------------  CALL BACK INFO  What is the best way for the office to contact you? OK to leave message on   voicemail  Preferred Call Back Phone Number? [phone]  ---------------------------------------------------------------------------  --------------  SCRIPT ANSWERS  Relationship to Patient?  Self

## 2022-04-12 ENCOUNTER — OFFICE VISIT (OUTPATIENT)
Dept: VASCULAR SURGERY | Age: 87
End: 2022-04-12
Payer: MEDICARE

## 2022-04-12 VITALS — SYSTOLIC BLOOD PRESSURE: 138 MMHG | DIASTOLIC BLOOD PRESSURE: 82 MMHG

## 2022-04-12 DIAGNOSIS — M31.6 TEMPORAL ARTERITIS (HCC): ICD-10-CM

## 2022-04-12 PROCEDURE — 99203 OFFICE O/P NEW LOW 30 MIN: CPT | Performed by: SURGERY

## 2022-04-18 ENCOUNTER — TELEPHONE (OUTPATIENT)
Dept: VASCULAR SURGERY | Age: 87
End: 2022-04-18

## 2022-04-18 NOTE — TELEPHONE ENCOUNTER
Edis decker called and cancelled his appt. For biopsy on Thursday with Dr. Jayshree Fry. States he is afraid to have it done. They will call back when he was to have done.

## 2022-04-25 ENCOUNTER — TELEPHONE (OUTPATIENT)
Dept: VASCULAR SURGERY | Age: 87
End: 2022-04-25

## 2022-04-28 RX ORDER — AMLODIPINE BESYLATE 5 MG/1
5 TABLET ORAL DAILY
Qty: 90 TABLET | Refills: 1 | Status: SHIPPED
Start: 2022-04-28 | End: 2022-10-07

## 2022-05-04 ENCOUNTER — OFFICE VISIT (OUTPATIENT)
Dept: FAMILY MEDICINE CLINIC | Age: 87
End: 2022-05-04
Payer: MEDICARE

## 2022-05-04 ENCOUNTER — TELEPHONE (OUTPATIENT)
Dept: VASCULAR SURGERY | Age: 87
End: 2022-05-04

## 2022-05-04 VITALS
WEIGHT: 199.7 LBS | BODY MASS INDEX: 28.59 KG/M2 | HEART RATE: 68 BPM | SYSTOLIC BLOOD PRESSURE: 128 MMHG | RESPIRATION RATE: 16 BRPM | OXYGEN SATURATION: 98 % | TEMPERATURE: 98.2 F | HEIGHT: 70 IN | DIASTOLIC BLOOD PRESSURE: 74 MMHG

## 2022-05-04 DIAGNOSIS — M31.6 TEMPORAL ARTERITIS (HCC): Primary | ICD-10-CM

## 2022-05-04 DIAGNOSIS — H35.30 MACULAR DEGENERATION, UNSPECIFIED LATERALITY, UNSPECIFIED TYPE: ICD-10-CM

## 2022-05-04 DIAGNOSIS — M31.6 TEMPORAL ARTERITIS (HCC): ICD-10-CM

## 2022-05-04 DIAGNOSIS — H18.413 ARCUS SENILIS OF BOTH EYES: ICD-10-CM

## 2022-05-04 LAB — SEDIMENTATION RATE, ERYTHROCYTE: 28 MM/HR (ref 0–15)

## 2022-05-04 PROCEDURE — 99214 OFFICE O/P EST MOD 30 MIN: CPT | Performed by: SURGERY

## 2022-05-04 NOTE — TELEPHONE ENCOUNTER
Pt. Called and cancelled his biopsy anita. For Friday states he had blood work done and is waiting for results and he will call back.

## 2022-05-04 NOTE — PROGRESS NOTES
Isabel Joseph (:  10/4/1932) is a 80 y.o. male,Established patient, here for evaluation of the following chief complaint(s):  1 Month Follow-Up and Health Maintenance (Due for Covid vaccine, Shingles, Pneumococcal, Tdap)         ASSESSMENT/PLAN:  1. Temporal arteritis  -Patient age of 48 years or older  -New onset of localized headache  -Temporal artery tenderness with decreased temporal artery pulse on the right  -Erythrocyte sedimentation rate of 50 mm per hour or greater is typical criteria, patient has ESR of 42 (upper limit of normal for his age is 21, even with 20% increase due to age, this is elevated significantly). Though just shy of the diagnostic threshold of 50, there are many care reports of bx confirm GCA with ESR under 30. Starting dosage    Prednisone in a dosage of 40 to 60 mg per day until the ESR is normal and the patient is asymptomatic    Dosage reduction    Decrease the dosage by 10 percent every 2 weeks to a dosage of 10 mg per day; then decrease the dosage by 1 mg per day every 4 weeks. Monitoring    Monitor the patient for symptom recurrence throughout the steroid taper; monitor the ESR every 4 weeks for 2 to 3 months, then every 8 to 12 weeks until 12 to 18 months after the cessation of therapy. If we choose to due steroids pending above Supplementation of calcium (calcium carbonate in a dosage of 1 to 1.5 g per day) and vitamin D (400 to 800 IU per day) may decrease the risk of osteoporosis and subsequent fractures in patients who are being treated with corticosteroids. EMERALD rule, patients with any of the following measurements should receive imaging:  -Systolic blood pressure > 701 mm Hg  -Diastolic blood pressure > 90 mm Hg  -Blood glucose level > 115 mg per dL (6.4 mmol per L)  -Serum potassium level < 3.9 mEq per L (3.9 mmol per L)    No CT head, non focal exam, EMERALD as above. None Laila Truong was followed by the patient.   He simply took 14 days of prednisone, did not follow back up for biopsy, did not follow back up for the next round of therapy will we would be doing a another ESR and dosage reduction based on what that ESR could potentially be. There is confusion because surgery consult notes he was only on 5 mg of prednisone per day which is inaccurate, at that time the patient was taking five 10 mg tablets of prednisone per day. Patient is going to get an ESR now. Recommended the patient resume prednisone therapy but he was hesitant to do this. This is because the patient feels \"so much better\" after the therapy already provided and he was understanding that this is incomplete therapy likely and that we really needed that lab work to be done in order to gauge how we approach this in the future. 2.  Macular degeneration   patient is to reach back out to ophthalmology regarding the torsemide and bimatoprost.      Return in about 3 months (around 8/4/2022) for GCA . Subjective   SUBJECTIVE/OBJECTIVE:  HPI:    Temporal arteritis: [over past week, right temporal area, described as \"just hurts. \"  Not described as the worst headache of his life or onset like align or clap. Did bump his head into the corner of a cabinet but had no ecchymosis or pain or any other prodromal symptoms such as dizziness or syncope. No sinus congestion, pressure or fullness, no rhinorrhea,  No post nasal drip. Has nonproductive dry cough over same period of time. Further probing of the patient he actually admits to having rhinorrhea over the past two weeks with improvement but still denies sinus congestion/pressure/fullness, no ear symptoms such as popping/crackling/fullness/decreased hearing/tinnitus. No visual disturbance. No proximal muscle weakness. ]    Only completed 14 days of suppressive therapy.   Patient did not follow instructions printed on the after visit summary nor outlined clearly to him (and also relayed to him on 2 separate occasions by telephone) that he is to continue taking his prednisone at 50 mg until otherwise instructed and that he would need to call the office for another refill for this because we were going to have an ESR to determine whether or not we could do a dose reduction. None of these things were accomplished. He did go to the surgeon for a biopsy and the patient does not want to have 1 of these and apparently skipped out on his scheduled time for procedure or a follow-up office visit. At this point we are probably too far out including therapy in order for the biopsy to be of significant use. Patient does state he has no longer any headache at all (went away after about 1.5 weeks of therapy) and that he is \"feeling great. \"  He no longer has any pain with palpation on the right temporal area. He denies any visual disturbance or visual change. ROS:    Denies 10pt ROS other than noted in HPI. Objective       PHYSICAL EXAM:    /74   Pulse 68   Temp 98.2 °F (36.8 °C) (Temporal)   Resp 16   Ht 5' 10\" (1.778 m)   Wt 199 lb 11.2 oz (90.6 kg)   SpO2 98%   BMI 28.65 kg/m²     AVSS    GA: Well-groomed, appears well, no acute distress. HEENT: Atraumatic normocephalic. Extraocular muscles are intact without focal deficit. Pupils are equal round reactive to light. There is senile arcus present bilaterally (though a normal lipid panel), confounding visual fields are intact. Conjunctiva pink and moist.  Hearing is grossly intact. No pain with palpation along the temporalis. NECK: Trachea is midline, supple, nontender, no lymphadenopathy. CARDIO: Regular rate and rhythm, normal S1/S2. Cap refill 2+. Radial pulses 2+ bilaterally. temporal pulse is still diminished on right when compared to the left. RESPIRATORY: Clear to auscultation bilaterally without wheezes rales or rhonchi. Normal inspiratory and expiratory effort. Normoxic on room air    MSK: Structurally appropriate for age.   No gross deficit. NEURO: Alert, no gross deficit. CN II to XII intact without focal deficit. PSYCH:  Mood is normal and congruent with affect. No signs of psychomotor retardation or agitation. Thought content seems normal, speech is fluent and non-pressured. SKIN: Generally warm pink and dry. There is tenderness with palpation in the right temporal area. An electronic signature was used to authenticate this note.     --Nato Helms, DO

## 2022-05-05 DIAGNOSIS — M31.6 TEMPORAL ARTERITIS (HCC): ICD-10-CM

## 2022-05-05 DIAGNOSIS — M31.6 TEMPORAL ARTERITIS (HCC): Primary | ICD-10-CM

## 2022-05-05 RX ORDER — PREDNISONE 1 MG/1
TABLET ORAL
Qty: 175 TABLET | Refills: 0 | Status: SHIPPED
Start: 2022-05-05 | End: 2022-06-23

## 2022-05-05 RX ORDER — PREDNISONE 10 MG/1
TABLET ORAL
Qty: 203 TABLET | Refills: 0 | Status: SHIPPED
Start: 2022-05-05 | End: 2022-06-23

## 2022-05-06 ENCOUNTER — TELEPHONE (OUTPATIENT)
Dept: RHEUMATOLOGY | Age: 87
End: 2022-05-06

## 2022-05-06 ENCOUNTER — TELEPHONE (OUTPATIENT)
Dept: FAMILY MEDICINE CLINIC | Age: 87
End: 2022-05-06

## 2022-05-06 NOTE — TELEPHONE ENCOUNTER
----- Message from Marbin Coleman DO sent at 5/5/2022 11:29 AM EDT -----  Regarding: FW: GCA  Can you put this patient on the cancellation list as well. If were not able to get him in somewhere within the next 2 weeks let me know and I will find a spot to squeeze him in.  ----- Message -----  From: Torri Browne DO  Sent: 5/5/2022  11:04 AM EDT  To: Marbin Coleman DO  Subject: GCA                                              Dr. Wes Patel: This is an 22-year-old gentleman with prior history of thoracic aortic aneurysm, hypertension, depression, age-related macular degeneration that came in with a new onset complaint of right-sided temporal pain with palpation and associated headache. He fit the criteria for temporal arteritis based on the fact age greater than 48, new onset of a localized headache, tenderness over the temporal artery, decreased temporal artery pulse on right compared to left, erythrocyte sedimentation rate of 42 as well as CRP of 3.3. My initial plan was to immediately get him in for biopsy and I started him on 50 mg/day of prednisone over a 4-week period. By that point he should have had his biopsy-proven results and we would have been ready to start the taper of prednisone pending repeat ESR. The patient refused to follow-up with this did not obtain biopsy as planned (had apt within the week of diagnosis) and did not continue taking his prednisone. I saw the patient yesterday and he felt better, no longer had a headache. Still had complaint of tenderness from time to time in the right temporal area. No changes in vision. There is still a decreased temporal pulse on the right though he denied any pain with palpation. Repeat ESR did demonstrate a significant reduction down to 28 mm/h but I found this still abnormal especially in a patient that does not have any other rheumatologic process going on (even though age alone can increase that it should not be this much).     I resumed his prednisone with a plan to go back to the immunosuppressive dose and decrease the dose by 10% every 2 weeks until we get to him being on 10 mg/day (phase 1). The next dose reduction will go a bit faster by about 20% reduction each week after that (phase 2). Throughout this time I plan to obtain ESR monthly then once every 12 weeks for about 12 months after he completes therapy. I have him on 600 mg of calcium carbonate along with 800 international units of vitamin D3 twice daily during this process. He has GI prophylaxis on board as well. This will be the second time of come across this in practice but the first time I have had a patient be so noncompliant. Thank you again for all the help you provide.     Regards:    Dr. Charlotte Peck

## 2022-05-06 NOTE — TELEPHONE ENCOUNTER
Called and spoke with Valeria Briseno from [facility] [place] office. Patient can be scheduled prior to seeing Linnea Serrano.

## 2022-05-06 NOTE — TELEPHONE ENCOUNTER
----- Message from [address] sent at 5/6/2022 10:52 AM EDT -----  Subject: Message to Provider    QUESTIONS  Information for Provider? Dr Shamar Culp called to say that they called the   pt for an appt and was told by the pt that he needs to see Dr Otoniel Garcia   first. Please call Dr Alicia London and clarify  ---------------------------------------------------------------------------  --------------  [address]  What is the best way for the office to contact you? OK to leave message on   voicemail  Preferred Call Back Phone Number? [phone]  ---------------------------------------------------------------------------  --------------  SCRIPT ANSWERS  Relationship to Patient? Third Party  Third Party Type? Physician Office? Representative Name?  Mila Muir

## 2022-05-11 ENCOUNTER — TELEPHONE (OUTPATIENT)
Dept: FAMILY MEDICINE CLINIC | Age: 87
End: 2022-05-11

## 2022-05-11 ENCOUNTER — OFFICE VISIT (OUTPATIENT)
Dept: FAMILY MEDICINE CLINIC | Age: 87
End: 2022-05-11
Payer: MEDICARE

## 2022-05-11 VITALS
DIASTOLIC BLOOD PRESSURE: 82 MMHG | BODY MASS INDEX: 28.6 KG/M2 | SYSTOLIC BLOOD PRESSURE: 136 MMHG | HEART RATE: 69 BPM | RESPIRATION RATE: 16 BRPM | HEIGHT: 70 IN | WEIGHT: 199.8 LBS | TEMPERATURE: 97.4 F | OXYGEN SATURATION: 99 %

## 2022-05-11 DIAGNOSIS — Z91.199 NONCOMPLIANCE: ICD-10-CM

## 2022-05-11 DIAGNOSIS — M31.6 TEMPORAL ARTERITIS (HCC): Primary | ICD-10-CM

## 2022-05-11 PROCEDURE — 99213 OFFICE O/P EST LOW 20 MIN: CPT | Performed by: SURGERY

## 2022-05-11 RX ORDER — LATANOPROST 50 UG/ML
SOLUTION/ DROPS OPHTHALMIC
COMMUNITY
Start: 2022-04-22 | End: 2022-09-28 | Stop reason: ALTCHOICE

## 2022-05-11 NOTE — PATIENT INSTRUCTIONS
Phase 1: 50mg by mouth for 14 days, 40mg by mouth for 14 days, 30mg by mouth for 14 days, 20mg by mouth for 14 days, 10mg by mouth for 7 days. Phase 2:  to begin after Phase 1.  9mg by mouth for 7 days, 7mg by mouth for 7 days, 5mg by mouth for 7 days, 3mg by mouth for 7 days, 1mg by mouth for 7 days then stop.       Call back to Rheumatology and get on cancellation list.
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
## 2022-05-23 ENCOUNTER — OFFICE VISIT (OUTPATIENT)
Dept: RHEUMATOLOGY | Age: 87
End: 2022-05-23
Payer: MEDICARE

## 2022-05-23 VITALS
DIASTOLIC BLOOD PRESSURE: 82 MMHG | WEIGHT: 199 LBS | RESPIRATION RATE: 16 BRPM | HEIGHT: 70 IN | BODY MASS INDEX: 28.49 KG/M2 | OXYGEN SATURATION: 96 % | SYSTOLIC BLOOD PRESSURE: 138 MMHG | HEART RATE: 57 BPM

## 2022-05-23 DIAGNOSIS — I10 ESSENTIAL HYPERTENSION: ICD-10-CM

## 2022-05-23 DIAGNOSIS — D84.9 IMMUNOSUPPRESSION (HCC): ICD-10-CM

## 2022-05-23 DIAGNOSIS — M31.6 TEMPORAL ARTERITIS (HCC): Primary | ICD-10-CM

## 2022-05-23 DIAGNOSIS — Z79.899 HIGH RISK MEDICATION USE: ICD-10-CM

## 2022-05-23 PROCEDURE — 99205 OFFICE O/P NEW HI 60 MIN: CPT | Performed by: INTERNAL MEDICINE

## 2022-05-23 ASSESSMENT — ENCOUNTER SYMPTOMS
NAUSEA: 0
DIARRHEA: 0
ABDOMINAL PAIN: 0
VOMITING: 0
COUGH: 0
TROUBLE SWALLOWING: 0
COLOR CHANGE: 0
SHORTNESS OF BREATH: 0
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
## 2022-05-23 NOTE — PATIENT INSTRUCTIONS
Decrease prednisone to 4 tabs daily for 7 days, then decrease prednisone 3 tabs daily for 7 days, then 2 tabs for 7 days, then 1.5 tabs daily

## 2022-05-24 DIAGNOSIS — M31.6 TEMPORAL ARTERITIS (HCC): ICD-10-CM

## 2022-05-24 LAB
C-REACTIVE PROTEIN: 0.3 MG/DL (ref 0–0.4)
SEDIMENTATION RATE, ERYTHROCYTE: 5 MM/HR (ref 0–15)

## 2022-06-07 DIAGNOSIS — M31.6 TEMPORAL ARTERITIS (HCC): ICD-10-CM

## 2022-06-07 RX ORDER — MELATONIN
1000 DAILY
Qty: 90 TABLET | Refills: 1 | Status: SHIPPED | OUTPATIENT
Start: 2022-06-07

## 2022-06-23 ENCOUNTER — OFFICE VISIT (OUTPATIENT)
Dept: RHEUMATOLOGY | Age: 87
End: 2022-06-23
Payer: MEDICARE

## 2022-06-23 VITALS
SYSTOLIC BLOOD PRESSURE: 130 MMHG | RESPIRATION RATE: 14 BRPM | BODY MASS INDEX: 28.49 KG/M2 | WEIGHT: 199 LBS | OXYGEN SATURATION: 97 % | DIASTOLIC BLOOD PRESSURE: 82 MMHG | HEIGHT: 70 IN | HEART RATE: 71 BPM

## 2022-06-23 DIAGNOSIS — Z79.899 HIGH RISK MEDICATION USE: ICD-10-CM

## 2022-06-23 DIAGNOSIS — D84.9 IMMUNOSUPPRESSION (HCC): ICD-10-CM

## 2022-06-23 DIAGNOSIS — M31.6 TEMPORAL ARTERITIS (HCC): ICD-10-CM

## 2022-06-23 DIAGNOSIS — I10 ESSENTIAL HYPERTENSION: ICD-10-CM

## 2022-06-23 DIAGNOSIS — M31.6 TEMPORAL ARTERITIS (HCC): Primary | ICD-10-CM

## 2022-06-23 LAB — C-REACTIVE PROTEIN: 0.7 MG/DL (ref 0–0.4)

## 2022-06-23 PROCEDURE — 99215 OFFICE O/P EST HI 40 MIN: CPT | Performed by: INTERNAL MEDICINE

## 2022-06-23 PROCEDURE — 1123F ACP DISCUSS/DSCN MKR DOCD: CPT | Performed by: INTERNAL MEDICINE

## 2022-06-23 RX ORDER — PREDNISONE 1 MG/1
12.5 TABLET ORAL DAILY
Qty: 90 TABLET | Refills: 1 | Status: SHIPPED
Start: 2022-06-23 | End: 2022-09-28 | Stop reason: ALTCHOICE

## 2022-06-23 ASSESSMENT — ENCOUNTER SYMPTOMS
DIARRHEA: 0
COUGH: 0
ABDOMINAL PAIN: 0
TROUBLE SWALLOWING: 0
COLOR CHANGE: 0
VOMITING: 0
NAUSEA: 0
SHORTNESS OF BREATH: 0
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
## 2022-06-23 NOTE — PATIENT INSTRUCTIONS
Start tomorrow decrease prednisone to 2.5 five mg tabs daily for 2 weeks, then decrease to 2 tabs daily for 2 weeks, then 1.5 tabs daily for 2 weeks, then one tab daily for 2 weeks, then 1/2 tab daily for 2 weeks, then stop    Have blood work monthly

## 2022-06-24 LAB — SEDIMENTATION RATE, ERYTHROCYTE: 19 MM/HR (ref 0–15)

## 2022-08-01 ENCOUNTER — TELEPHONE (OUTPATIENT)
Dept: RHEUMATOLOGY | Age: 87
End: 2022-08-01

## 2022-08-01 NOTE — TELEPHONE ENCOUNTER
Pt called to see if he will need blood work prior to his visit with Dr Beau Sandifer on 09-27-22.   Please let him know [phone]

## 2022-08-09 ENCOUNTER — OFFICE VISIT (OUTPATIENT)
Dept: RHEUMATOLOGY | Age: 87
End: 2022-08-09
Payer: MEDICARE

## 2022-08-09 VITALS
BODY MASS INDEX: 28.49 KG/M2 | HEART RATE: 88 BPM | RESPIRATION RATE: 16 BRPM | HEIGHT: 70 IN | WEIGHT: 199 LBS | SYSTOLIC BLOOD PRESSURE: 146 MMHG | OXYGEN SATURATION: 100 % | DIASTOLIC BLOOD PRESSURE: 84 MMHG

## 2022-08-09 DIAGNOSIS — I10 ESSENTIAL HYPERTENSION: ICD-10-CM

## 2022-08-09 DIAGNOSIS — M31.6 TEMPORAL ARTERITIS (HCC): ICD-10-CM

## 2022-08-09 DIAGNOSIS — M31.6 TEMPORAL ARTERITIS (HCC): Primary | ICD-10-CM

## 2022-08-09 DIAGNOSIS — M54.50 ACUTE LEFT-SIDED LOW BACK PAIN WITHOUT SCIATICA: ICD-10-CM

## 2022-08-09 DIAGNOSIS — Z79.899 HIGH RISK MEDICATION USE: ICD-10-CM

## 2022-08-09 DIAGNOSIS — D84.9 IMMUNOSUPPRESSION (HCC): ICD-10-CM

## 2022-08-09 LAB — SEDIMENTATION RATE, ERYTHROCYTE: 3 MM/HR (ref 0–15)

## 2022-08-09 PROCEDURE — 99215 OFFICE O/P EST HI 40 MIN: CPT | Performed by: INTERNAL MEDICINE

## 2022-08-09 PROCEDURE — 1123F ACP DISCUSS/DSCN MKR DOCD: CPT | Performed by: INTERNAL MEDICINE

## 2022-08-09 ASSESSMENT — ENCOUNTER SYMPTOMS
BACK PAIN: 1
TROUBLE SWALLOWING: 0
COUGH: 0
COLOR CHANGE: 0
NAUSEA: 0
DIARRHEA: 0
VOMITING: 0
ABDOMINAL PAIN: 0
SHORTNESS OF BREATH: 0

## 2022-08-09 NOTE — PATIENT INSTRUCTIONS
Decrease prednisone to 2 tabs daily for 2 weeks, then 1.5 tabs daily for 2 weeks, then 1 tab daily for 2 weeks, then 0.5 tab daily for 2 weeks then stop    Try some tylenol for back, also OK to try salonpas lidocaine patches
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
## 2022-08-12 ENCOUNTER — TELEPHONE (OUTPATIENT)
Dept: FAMILY MEDICINE CLINIC | Age: 87
End: 2022-08-12

## 2022-08-12 NOTE — TELEPHONE ENCOUNTER
Patient called stating he is having left sided sciatic pain times one week. He is asking if there is Rx he can get for this. Please advise.     Last seen 5/11/2022  Next appt 9/19/2022    Mir Abraham

## 2022-08-12 NOTE — TELEPHONE ENCOUNTER
He was given exercises by rheumatology for this and instructed to schedule Tylenol. No other medications for this. If he wants physical therapy, that would be most appropriate.

## 2022-08-16 ENCOUNTER — OFFICE VISIT (OUTPATIENT)
Dept: FAMILY MEDICINE CLINIC | Age: 87
End: 2022-08-16
Payer: MEDICARE

## 2022-08-16 VITALS
OXYGEN SATURATION: 98 % | HEART RATE: 74 BPM | BODY MASS INDEX: 29.2 KG/M2 | RESPIRATION RATE: 18 BRPM | DIASTOLIC BLOOD PRESSURE: 74 MMHG | HEIGHT: 70 IN | WEIGHT: 204 LBS | SYSTOLIC BLOOD PRESSURE: 126 MMHG

## 2022-08-16 DIAGNOSIS — R10.9 LEFT FLANK PAIN: ICD-10-CM

## 2022-08-16 DIAGNOSIS — R10.9 LEFT FLANK PAIN: Primary | ICD-10-CM

## 2022-08-16 DIAGNOSIS — M54.50 LUMBAR BACK PAIN: ICD-10-CM

## 2022-08-16 DIAGNOSIS — B37.2 CUTANEOUS CANDIDIASIS: ICD-10-CM

## 2022-08-16 DIAGNOSIS — M31.6 TEMPORAL ARTERITIS (HCC): ICD-10-CM

## 2022-08-16 LAB
BILIRUBIN URINE: NEGATIVE
BLOOD, URINE: NEGATIVE
CLARITY: CLEAR
COLOR: YELLOW
GLUCOSE URINE: NEGATIVE MG/DL
KETONES, URINE: ABNORMAL MG/DL
LEUKOCYTE ESTERASE, URINE: NEGATIVE
NITRITE, URINE: NEGATIVE
PH UA: 6 (ref 5–9)
PROTEIN UA: NEGATIVE MG/DL
SPECIFIC GRAVITY UA: 1.02 (ref 1–1.03)
UROBILINOGEN, URINE: 0.2 E.U./DL

## 2022-08-16 PROCEDURE — 81003 URINALYSIS AUTO W/O SCOPE: CPT | Performed by: FAMILY MEDICINE

## 2022-08-16 PROCEDURE — 1123F ACP DISCUSS/DSCN MKR DOCD: CPT | Performed by: FAMILY MEDICINE

## 2022-08-16 PROCEDURE — 99214 OFFICE O/P EST MOD 30 MIN: CPT | Performed by: FAMILY MEDICINE

## 2022-08-16 RX ORDER — BACLOFEN 10 MG/1
10 TABLET ORAL 3 TIMES DAILY
Qty: 90 TABLET | Refills: 0 | Status: SHIPPED | OUTPATIENT
Start: 2022-08-16 | End: 2022-09-05

## 2022-08-16 RX ORDER — NYSTATIN 100000 U/G
CREAM TOPICAL
Qty: 60 G | Refills: 2 | Status: SHIPPED | OUTPATIENT
Start: 2022-08-16

## 2022-08-16 RX ORDER — IBUPROFEN 800 MG/1
800 TABLET ORAL 3 TIMES DAILY
Qty: 30 TABLET | Refills: 0 | Status: SHIPPED
Start: 2022-08-16 | End: 2022-09-28 | Stop reason: ALTCHOICE

## 2022-08-16 ASSESSMENT — ENCOUNTER SYMPTOMS
SHORTNESS OF BREATH: 0
COLOR CHANGE: 0
EYE DISCHARGE: 0
CONSTIPATION: 0
NAUSEA: 0
VOMITING: 0
CHOKING: 0
EYE ITCHING: 0
BACK PAIN: 1
ABDOMINAL DISTENTION: 0
EYE PAIN: 0
BLOOD IN STOOL: 0
DIARRHEA: 0
PHOTOPHOBIA: 0
EYE REDNESS: 0
ROS SKIN COMMENTS: TO GROIN
SORE THROAT: 0
COUGH: 0
ANAL BLEEDING: 0
SINUS PRESSURE: 0
STRIDOR: 0
APNEA: 0
ABDOMINAL PAIN: 0
CHEST TIGHTNESS: 0
RHINORRHEA: 0
VOICE CHANGE: 0
RECTAL PAIN: 0
TROUBLE SWALLOWING: 0
WHEEZING: 0
FACIAL SWELLING: 0
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
## 2022-08-16 NOTE — LETTER
[facility] [address] [place] [id number]  Phone: [phone]  Fax: [address], [place]         August 16, 2022     Patient: Moe Wolfe   YOB: 1932   Date of Visit: 8/16/2022       To Whom It May Concern: It is my medical opinion that Moe Wolfe requires a disability parking placard for the following reasons:  He cannot walk 200 feet without stopping to rest.  Duration of need: permanent    If you have any questions or concerns, please don't hesitate to call.     Sincerely,        Tolu Pedraza DO

## 2022-08-23 ENCOUNTER — TELEPHONE (OUTPATIENT)
Dept: FAMILY MEDICINE CLINIC | Age: 87
End: 2022-08-23

## 2022-08-23 NOTE — TELEPHONE ENCOUNTER
Patient called to follow up on X ray results and urine labs. Informed that Dr. Evaristo Camacho is not in the ofc this wk and that I will send msg to provider who is covering his messages. Patient verbalized understanding.     Last seen 8/16/2022  Next appt 9/19/2022

## 2022-08-24 NOTE — TELEPHONE ENCOUNTER
Per Dr. Kendy Esteves -     Lab and x-ray results reviewed onset 8/23/2022:     X-ray of lumbar spine revealed arthritis but no fracture or dislocation. X-ray of the abdomen showed some constipation but otherwise no abnormalities. Urinalysis was essentially unremarkable. Please go ahead and deliver these results to the patient. Encouraged him to keep his follow-up visit with Dr. Emil Mcallister.

## 2022-08-24 NOTE — TELEPHONE ENCOUNTER
I called patient and left  msg requesting that he call the ofc back to go over imaging and lab results.

## 2022-08-25 NOTE — TELEPHONE ENCOUNTER
I called patient and informed of results, as noted by Dr. Nita De La Garza. Patient verbalized understanding.

## 2022-09-08 DIAGNOSIS — M31.6 TEMPORAL ARTERITIS (HCC): ICD-10-CM

## 2022-09-09 ENCOUNTER — TELEPHONE (OUTPATIENT)
Dept: RHEUMATOLOGY | Age: 87
End: 2022-09-09

## 2022-09-09 LAB — SEDIMENTATION RATE, ERYTHROCYTE: 1 MM/HR (ref 0–15)

## 2022-09-09 NOTE — TELEPHONE ENCOUNTER
Patient called the office with concern regarding his eye pressure. Patient states that when he saw the eye doctor recently his eye pressure was reading elevated at 44, and it should not be no more than 20. Patient was prescribed eye drops to help lower the pressure in the eyes. But the patient is very concerned that the prednisone is effecting the pressure. Patient also voiced that he is going on a cruise and wants to be able to go. Addressed message/concern with Dr. Keisha Ponce and suggest that the patient takes 2.5mg daily for one week then stop medication. Patient is cleared to attend his cruise. Patient is notified of dr's message and instructions and patient voiced understanding of the dosing for his prednisone.       Electronically signed by Jareth Nava MA on 9/9/2022 at 2:21 PM

## 2022-09-23 ENCOUNTER — TELEPHONE (OUTPATIENT)
Dept: FAMILY MEDICINE CLINIC | Age: 87
End: 2022-09-23

## 2022-09-23 DIAGNOSIS — Z12.5 PROSTATE CANCER SCREENING: ICD-10-CM

## 2022-09-23 DIAGNOSIS — E78.5 HYPERLIPIDEMIA, UNSPECIFIED HYPERLIPIDEMIA TYPE: Primary | ICD-10-CM

## 2022-09-23 NOTE — TELEPHONE ENCOUNTER
----- Message from Ute Green sent at 9/23/2022  1:28 PM EDT -----  Subject: Message to Provider    QUESTIONS  Information for Provider? Patient is currently scheduled to come in on   9/28 but he would like to have his labs done ahead of time. Can we please   put the orders in now so he can hopefully have the results the day of his   appointment. Please contact the patient once the orders are in.   ---------------------------------------------------------------------------  --------------  Wan HARTMAN  [phone]; OK to leave message on voicemail  ---------------------------------------------------------------------------  --------------  SCRIPT ANSWERS  Relationship to Patient?  Self

## 2022-09-26 NOTE — TELEPHONE ENCOUNTER
Per Dr Marquetta Burkitt: done     Called pt but voicemail box not set up yet. Will try again later.

## 2022-09-27 DIAGNOSIS — E78.5 HYPERLIPIDEMIA, UNSPECIFIED HYPERLIPIDEMIA TYPE: ICD-10-CM

## 2022-09-27 DIAGNOSIS — Z12.5 PROSTATE CANCER SCREENING: ICD-10-CM

## 2022-09-27 LAB
CHOLESTEROL, FASTING: 197 MG/DL (ref 0–199)
HDLC SERPL-MCNC: 73 MG/DL
LDL CHOLESTEROL CALCULATED: 110 MG/DL (ref 0–99)
PROSTATE SPECIFIC ANTIGEN: 3.05 NG/ML (ref 0–4)
TRIGLYCERIDE, FASTING: 68 MG/DL (ref 0–149)
VLDLC SERPL CALC-MCNC: 14 MG/DL

## 2022-09-28 ENCOUNTER — OFFICE VISIT (OUTPATIENT)
Dept: FAMILY MEDICINE CLINIC | Age: 87
End: 2022-09-28
Payer: MEDICARE

## 2022-09-28 VITALS
HEIGHT: 70 IN | RESPIRATION RATE: 18 BRPM | SYSTOLIC BLOOD PRESSURE: 136 MMHG | OXYGEN SATURATION: 97 % | BODY MASS INDEX: 29.11 KG/M2 | TEMPERATURE: 97.8 F | HEART RATE: 64 BPM | DIASTOLIC BLOOD PRESSURE: 84 MMHG | WEIGHT: 203.3 LBS

## 2022-09-28 DIAGNOSIS — H40.1130 PRIMARY OPEN ANGLE GLAUCOMA OF BOTH EYES, UNSPECIFIED GLAUCOMA STAGE: Primary | ICD-10-CM

## 2022-09-28 DIAGNOSIS — H35.3132 NONEXUDATIVE AGE-RELATED MACULAR DEGENERATION, BILATERAL, INTERMEDIATE DRY STAGE: ICD-10-CM

## 2022-09-28 DIAGNOSIS — M31.6 TEMPORAL ARTERITIS (HCC): ICD-10-CM

## 2022-09-28 DIAGNOSIS — I71.20 THORACIC AORTIC ANEURYSM WITHOUT RUPTURE: ICD-10-CM

## 2022-09-28 DIAGNOSIS — N52.9 ED (ERECTILE DYSFUNCTION) OF ORGANIC ORIGIN: ICD-10-CM

## 2022-09-28 DIAGNOSIS — I10 ESSENTIAL HYPERTENSION: ICD-10-CM

## 2022-09-28 PROCEDURE — 1123F ACP DISCUSS/DSCN MKR DOCD: CPT | Performed by: SURGERY

## 2022-09-28 PROCEDURE — 99213 OFFICE O/P EST LOW 20 MIN: CPT | Performed by: SURGERY

## 2022-09-28 RX ORDER — BRIMONIDINE TARTRATE 2 MG/ML
SOLUTION/ DROPS OPHTHALMIC
COMMUNITY
Start: 2022-09-07

## 2022-09-28 RX ORDER — DORZOLAMIDE HCL 20 MG/ML
SOLUTION/ DROPS OPHTHALMIC
COMMUNITY
Start: 2022-09-26

## 2022-09-28 RX ORDER — LATANOPROSTENE BUNOD 0.24 MG/ML
SOLUTION/ DROPS OPHTHALMIC
COMMUNITY

## 2022-09-28 NOTE — PATIENT INSTRUCTIONS
Cholesterol Panel:  Cholesterol, Fasting 0 - 199 mg/dL 197          Triglyceride, Fasting 0 - 149 mg/dL 68          HDL >40 mg/dL 73  72  71 Abnormal  R   R  77 Abnormal  R  72 Abnormal  R  81    LDL Calculated 0 - 99 mg/dL 110 High   84  90 R  90 R  81 R  66 R  96    VLDL Cholesterol Calculated mg/dL 14  10            PSA:  3.05

## 2022-09-28 NOTE — PROGRESS NOTES
Cal Cassidy (:  10/4/1932) is a 80 y.o. male,Established patient, here for evaluation of the following chief complaint(s):  Follow-up ( 6 month follow up), Eye Problem (Eye Pressure), Discuss Labs (Discuss Blood work), and Health Maintenance (Due for Covid Vaccine, Pneumococcal, Shingles Vaccine, Tdap, Flu Vaccine.)         ASSESSMENT/PLAN:  1. Primary open angle glaucoma of both eyes, unspecified glaucoma stage  2. Nonexudative age-related macular degeneration, bilateral, intermediate dry stage  3. ED (erectile dysfunction) of organic origin  4. Temporal arteritis ([address]  5. Thoracic aortic aneurysm without rupture  6. Essential hypertension    Inflammatory markers are all down. He is now off the prednisone. We need updated documentation about tonometry of the globe from optho but reassuring last pressure was down from the zenith that prompted the more rapid taper. He will have apt with ophto soon. Reassuring there are no reported s/s or findings concerning his TAA in setting of GCA. Hopeful this would be his last visit to rheumatologist and we are out of the woods with this one. Otherwise, things are stable, no changes to medications. Return in about 6 months (around 3/28/2023) for AWV .          Subjective   SUBJECTIVE/OBJECTIVE:  HPI:    GCA  -completed prednisone  -following with Dr. Yandel Carias  -tapered completely from the prednisone  -no more symptoms       Ophthalmology:    -has another appointment coming up in 3 to 4 days  -Brimonidine  -Latanoprostene  -Dorzalamide       ASCVD:  -  Cholesterol, Fasting 0 - 199 mg/dL 197          Triglyceride, Fasting 0 - 149 mg/dL 68          HDL >40 mg/dL 73  72  71 Abnormal  R   R  77 Abnormal  R  72 Abnormal  R  81    LDL Calculated 0 - 99 mg/dL 110 High   84  90 R  90 R  81 R  66 R  96    VLDL Cholesterol Calculated mg/dL 14  10              PSA:      Preventative:  Health Maintenance   Topic Date Due    Flu vaccine (1) 2024 (Originally 2022) DTaP/Tdap/Td vaccine (1 - Tdap) 09/12/2025 (Originally 2/23/2013)    Shingles vaccine (1 of 2) 09/03/2027 (Originally 10/4/1951)    COVID-19 Vaccine (1) 10/07/2027 (Originally 4/4/1933)    Pneumococcal 65+ years Vaccine (1 - PCV) 09/29/2028 (Originally 10/4/1938)    Depression Monitoring  03/15/2023    Hepatitis A vaccine  Aged Out    Hepatitis B vaccine  Aged Out    Hib vaccine  Aged Out    Meningococcal (ACWY) vaccine  Aged Out           ROS:    Denies 10pt ROS other than noted in HPI. Objective       PHYSICAL EXAM:    /84   Pulse 64   Temp 97.8 °F (36.6 °C) (Temporal)   Resp 18   Ht 5' 10\" (1.778 m)   Wt 203 lb 4.8 oz (92.2 kg)   SpO2 97%   BMI 29.17 kg/m²       AVSS     GA: Well-groomed, appears well, no acute distress. HEENT: Atraumatic normocephalic. Extraocular muscles are intact without focal deficit. Pupils are equal round reactive to light. There is senile arcus present bilaterally (though a normal lipid panel), confounding visual fields are intact. Conjunctiva pink and moist.  Hearing is grossly intact. No pain with palpation along the temporalis. NECK: Trachea is midline, supple, nontender, no lymphadenopathy. CARDIO: Regular rate and rhythm, normal S1/S2. Cap refill 2+. Radial pulses 2+ bilaterally. temporal pulse is 2+ and appreciated bilaterally. RESPIRATORY: Clear to auscultation bilaterally without wheezes rales or rhonchi. Normal inspiratory and expiratory effort. Normoxic on room air     MSK: Structurally appropriate for age. No gross deficit. NEURO: Alert, no gross deficit. PSYCH:  Mood is normal and congruent with affect. No signs of psychomotor retardation or agitation. Thought content seems normal, speech is fluent and non-pressured. SKIN: Generally warm pink and dry. Calamine lotion stains the skin of bilateral antebrachium circumferentially.             On this date 9/28/2022 I have spent 24 minutes reviewing previous notes, test results and face to face with the patient discussing the diagnosis and importance of compliance with the treatment plan as well as documenting on the day of the visit. An electronic signature was used to authenticate this note.     --Shabbir Mehta, DO

## 2022-10-07 RX ORDER — AMLODIPINE BESYLATE 5 MG/1
TABLET ORAL
Qty: 90 TABLET | Refills: 1 | Status: SHIPPED | OUTPATIENT
Start: 2022-10-07

## 2022-12-22 DIAGNOSIS — I10 PRIMARY HYPERTENSION: ICD-10-CM

## 2022-12-23 RX ORDER — METOPROLOL SUCCINATE 25 MG/1
25 TABLET, EXTENDED RELEASE ORAL DAILY
Qty: 90 TABLET | Refills: 3 | Status: SHIPPED | OUTPATIENT
Start: 2022-12-23

## 2023-01-06 RX ORDER — SILDENAFIL 100 MG/1
100 TABLET, FILM COATED ORAL SEE ADMIN INSTRUCTIONS
Qty: 30 TABLET | Refills: 1 | Status: SHIPPED | OUTPATIENT
Start: 2023-01-06 | End: 2023-02-05

## 2023-03-14 RX ORDER — AMLODIPINE BESYLATE 5 MG/1
TABLET ORAL
Qty: 90 TABLET | Refills: 0 | Status: SHIPPED | OUTPATIENT
Start: 2023-03-14

## 2023-03-27 DIAGNOSIS — I10 PRIMARY HYPERTENSION: ICD-10-CM

## 2023-03-27 RX ORDER — METOPROLOL SUCCINATE 25 MG/1
25 TABLET, EXTENDED RELEASE ORAL DAILY
Qty: 90 TABLET | Refills: 2 | Status: SHIPPED | OUTPATIENT
Start: 2023-03-27

## 2023-03-27 NOTE — TELEPHONE ENCOUNTER
----- Message from August Domingo sent at 3/27/2023  9:46 AM EDT -----  Subject: Refill Request    QUESTIONS  Name of Medication? Other - metoprolol Succinate  Patient-reported dosage and instructions? 25mg 1 time a day  How many days do you have left? 25  Preferred Pharmacy? [facility] ([address] )  Pharmacy phone number (if available)? [phone]  ---------------------------------------------------------------------------  --------------,  Name of Medication? Other - amlodipine--Rx sent 3/14/23  Patient-reported dosage and instructions? 5mg 1 time a day  How many days do you have left? 20  Preferred Pharmacy? [facility] ([address] )  Pharmacy phone number (if available)? [phone]  ---------------------------------------------------------------------------  --------------  CALL BACK INFO  What is the best way for the office to contact you? OK to leave message on   voicemail  Preferred Call Back Phone Number? [phone]  ---------------------------------------------------------------------------  --------------  SCRIPT ANSWERS  Relationship to Patient?  Self

## 2023-04-05 ENCOUNTER — OFFICE VISIT (OUTPATIENT)
Dept: FAMILY MEDICINE CLINIC | Age: 88
End: 2023-04-05
Payer: MEDICARE

## 2023-04-05 VITALS
DIASTOLIC BLOOD PRESSURE: 80 MMHG | WEIGHT: 199.5 LBS | SYSTOLIC BLOOD PRESSURE: 138 MMHG | OXYGEN SATURATION: 97 % | HEART RATE: 66 BPM | TEMPERATURE: 98.1 F | RESPIRATION RATE: 18 BRPM | HEIGHT: 70 IN | BODY MASS INDEX: 28.56 KG/M2

## 2023-04-05 DIAGNOSIS — I71.21 ANEURYSM OF ASCENDING AORTA WITHOUT RUPTURE (HCC): ICD-10-CM

## 2023-04-05 DIAGNOSIS — N52.9 ED (ERECTILE DYSFUNCTION) OF ORGANIC ORIGIN: ICD-10-CM

## 2023-04-05 DIAGNOSIS — I10 ESSENTIAL HYPERTENSION: ICD-10-CM

## 2023-04-05 DIAGNOSIS — H40.1130 PRIMARY OPEN ANGLE GLAUCOMA OF BOTH EYES, UNSPECIFIED GLAUCOMA STAGE: ICD-10-CM

## 2023-04-05 DIAGNOSIS — H35.30 MACULAR DEGENERATION, UNSPECIFIED LATERALITY, UNSPECIFIED TYPE: ICD-10-CM

## 2023-04-05 DIAGNOSIS — Z71.89 ACP (ADVANCE CARE PLANNING): ICD-10-CM

## 2023-04-05 DIAGNOSIS — H91.13 PRESBYCUSIS OF BOTH EARS: ICD-10-CM

## 2023-04-05 DIAGNOSIS — Z00.00 MEDICARE ANNUAL WELLNESS VISIT, SUBSEQUENT: Primary | ICD-10-CM

## 2023-04-05 DIAGNOSIS — I10 PRIMARY HYPERTENSION: ICD-10-CM

## 2023-04-05 DIAGNOSIS — H61.23 HEARING LOSS OF BOTH EARS DUE TO CERUMEN IMPACTION: ICD-10-CM

## 2023-04-05 PROCEDURE — 69210 REMOVE IMPACTED EAR WAX UNI: CPT | Performed by: SURGERY

## 2023-04-05 PROCEDURE — G8427 DOCREV CUR MEDS BY ELIG CLIN: HCPCS | Performed by: SURGERY

## 2023-04-05 PROCEDURE — G0439 PPPS, SUBSEQ VISIT: HCPCS | Performed by: SURGERY

## 2023-04-05 PROCEDURE — 1123F ACP DISCUSS/DSCN MKR DOCD: CPT | Performed by: SURGERY

## 2023-04-05 PROCEDURE — 1036F TOBACCO NON-USER: CPT | Performed by: SURGERY

## 2023-04-05 PROCEDURE — G8417 CALC BMI ABV UP PARAM F/U: HCPCS | Performed by: SURGERY

## 2023-04-05 PROCEDURE — 99213 OFFICE O/P EST LOW 20 MIN: CPT | Performed by: SURGERY

## 2023-04-05 RX ORDER — SILDENAFIL 100 MG/1
100 TABLET, FILM COATED ORAL SEE ADMIN INSTRUCTIONS
Qty: 30 TABLET | Refills: 1 | Status: SHIPPED | OUTPATIENT
Start: 2023-04-05 | End: 2023-05-05

## 2023-04-05 RX ORDER — AMLODIPINE BESYLATE 5 MG/1
5 TABLET ORAL DAILY
Qty: 90 TABLET | Refills: 1 | Status: SHIPPED | OUTPATIENT
Start: 2023-04-05

## 2023-04-05 SDOH — ECONOMIC STABILITY: FOOD INSECURITY: WITHIN THE PAST 12 MONTHS, THE FOOD YOU BOUGHT JUST DIDN'T LAST AND YOU DIDN'T HAVE MONEY TO GET MORE.: NEVER TRUE

## 2023-04-05 SDOH — ECONOMIC STABILITY: FOOD INSECURITY: WITHIN THE PAST 12 MONTHS, YOU WORRIED THAT YOUR FOOD WOULD RUN OUT BEFORE YOU GOT MONEY TO BUY MORE.: NEVER TRUE

## 2023-04-05 SDOH — ECONOMIC STABILITY: INCOME INSECURITY: HOW HARD IS IT FOR YOU TO PAY FOR THE VERY BASICS LIKE FOOD, HOUSING, MEDICAL CARE, AND HEATING?: NOT HARD AT ALL

## 2023-04-05 ASSESSMENT — PATIENT HEALTH QUESTIONNAIRE - PHQ9
9. THOUGHTS THAT YOU WOULD BE BETTER OFF DEAD, OR OF HURTING YOURSELF: 0
SUM OF ALL RESPONSES TO PHQ QUESTIONS 1-9: 1
4. FEELING TIRED OR HAVING LITTLE ENERGY: 0
1. LITTLE INTEREST OR PLEASURE IN DOING THINGS: 0
7. TROUBLE CONCENTRATING ON THINGS, SUCH AS READING THE NEWSPAPER OR WATCHING TELEVISION: 0
2. FEELING DOWN, DEPRESSED OR HOPELESS: 0
SUM OF ALL RESPONSES TO PHQ QUESTIONS 1-9: 1
SUM OF ALL RESPONSES TO PHQ QUESTIONS 1-9: 1
8. MOVING OR SPEAKING SO SLOWLY THAT OTHER PEOPLE COULD HAVE NOTICED. OR THE OPPOSITE, BEING SO FIGETY OR RESTLESS THAT YOU HAVE BEEN MOVING AROUND A LOT MORE THAN USUAL: 0
SUM OF ALL RESPONSES TO PHQ9 QUESTIONS 1 & 2: 0
3. TROUBLE FALLING OR STAYING ASLEEP: 1
6. FEELING BAD ABOUT YOURSELF - OR THAT YOU ARE A FAILURE OR HAVE LET YOURSELF OR YOUR FAMILY DOWN: 0
5. POOR APPETITE OR OVEREATING: 0
SUM OF ALL RESPONSES TO PHQ QUESTIONS 1-9: 1
10. IF YOU CHECKED OFF ANY PROBLEMS, HOW DIFFICULT HAVE THESE PROBLEMS MADE IT FOR YOU TO DO YOUR WORK, TAKE CARE OF THINGS AT HOME, OR GET ALONG WITH OTHER PEOPLE: 0

## 2023-04-05 ASSESSMENT — LIFESTYLE VARIABLES
HOW MANY STANDARD DRINKS CONTAINING ALCOHOL DO YOU HAVE ON A TYPICAL DAY: 1 OR 2
HOW OFTEN DO YOU HAVE A DRINK CONTAINING ALCOHOL: 2-3 TIMES A WEEK

## 2023-04-05 NOTE — PATIENT INSTRUCTIONS
be sure to contact your doctor if you have any problems. Where can you learn more? Go to http://www.vivas.com/ and enter F075 to learn more about \"A Healthy Heart: Care Instructions. \"  Current as of: September 7, 2022               Content Version: 13.6  © 9977-0756 Healthwise, [employer]. Care instructions adapted under license by [facility] ([facility]). If you have questions about a medical condition or this instruction, always ask your healthcare professional. [place] [address] any warranty or liability for your use of this information. Personalized Preventive Plan for Adali Culver - 4/5/2023  Medicare offers a range of preventive health benefits. Some of the tests and screenings are paid in full while other may be subject to a deductible, co-insurance, and/or copay. Some of these benefits include a comprehensive review of your medical history including lifestyle, illnesses that may run in your family, and various assessments and screenings as appropriate. After reviewing your medical record and screening and assessments performed today your provider may have ordered immunizations, labs, imaging, and/or referrals for you. A list of these orders (if applicable) as well as your Preventive Care list are included within your After Visit Summary for your review. Other Preventive Recommendations:    A preventive eye exam performed by an eye specialist is recommended every 1-2 years to screen for glaucoma; cataracts, macular degeneration, and other eye disorders. A preventive dental visit is recommended every 6 months. Try to get at least 150 minutes of exercise per week or 10,000 steps per day on a pedometer . Order or download the FREE \"Exercise & Physical Activity: Your Everyday Guide\" from [employer]. Call 8-183.137.5679 or search [employer] online. You need 0789-4370 mg of calcium and 2546-1236 IU of vitamin D per day.  It is

## 2023-04-05 NOTE — PROGRESS NOTES
Medicare Annual Wellness Visit    Mariah Hager is here for Medicare AWV and Health Maintenance    Assessment & Plan   Medicare annual wellness visit, subsequent  All personal, family, social, surgical, medical history is reviewed and updated with patient. Allergies, medications updated. List of specialists follows with updated. DM/HM updated. ACP (advance care planning)  He has plans at home  He will bring in living will and he has POA    Presbycusis of both ears  -     External Referral To Audiology    Essential hypertension  -     amLODIPine (NORVASC) 5 MG tablet; Take 1 tablet by mouth daily, Disp-90 tablet, R-1Requesting 1 year supplyNormal  Lab Results   Component Value Date    WBC 7.0 03/29/2022    HGB 14.2 03/29/2022    HCT 42.2 03/29/2022    MCV 90.8 03/29/2022     03/29/2022       Lab Results   Component Value Date     03/29/2022    K 4.9 03/29/2022     03/29/2022    CO2 20 (L) 03/29/2022    BUN 16 03/29/2022    CREATININE 1.0 03/29/2022    GLUCOSE 94 03/29/2022    CALCIUM 9.3 03/29/2022    PROT 7.9 03/29/2022    LABALBU 4.1 03/29/2022    BILITOT 0.4 03/29/2022    ALKPHOS 45 03/29/2022    AST 23 03/29/2022    ALT 19 03/29/2022    LABGLOM >60 03/29/2022    GFRAA >60 03/29/2022         Primary hypertension  Primary open angle glaucoma of both eyes, unspecified glaucoma stage  Macular degeneration, unspecified laterality, unspecified type  ED (erectile dysfunction) of organic origin  -     sildenafil (VIAGRA) 100 MG tablet;  Take 1 tablet by mouth See Admin Instructions I TABLET AS NEEDED ONC A WEEK, Disp-30 tablet, R-1Normal  -     External Referral To Urology  Aneurysm of ascending aorta without rupture [facility])      Recommendations for Preventive Services Due: see orders and patient instructions/AVS.  Recommended screening schedule for the next 5-10 years is provided to the patient in written form: see Patient Instructions/AVS.     Return in 6 months (on 10/5/2023) for Medicare Annual

## 2023-04-18 ENCOUNTER — TELEPHONE (OUTPATIENT)
Dept: FAMILY MEDICINE CLINIC | Age: 88
End: 2023-04-18

## 2023-04-18 DIAGNOSIS — H34.8320 BRANCH RETINAL VEIN OCCLUSION OF LEFT EYE WITH MACULAR EDEMA: Primary | ICD-10-CM

## 2023-04-18 DIAGNOSIS — S32.020S CLOSED WEDGE COMPRESSION FRACTURE OF L2 VERTEBRA, SEQUELA: ICD-10-CM

## 2023-04-18 DIAGNOSIS — I10 ESSENTIAL HYPERTENSION: ICD-10-CM

## 2023-04-18 DIAGNOSIS — H40.1130 PRIMARY OPEN ANGLE GLAUCOMA OF BOTH EYES, UNSPECIFIED GLAUCOMA STAGE: ICD-10-CM

## 2023-04-18 RX ORDER — AMLODIPINE BESYLATE 5 MG/1
5 TABLET ORAL DAILY
Qty: 90 TABLET | Refills: 1 | Status: SHIPPED | OUTPATIENT
Start: 2023-04-18

## 2023-04-18 NOTE — TELEPHONE ENCOUNTER
Patient called stating when he was seen on 4.5.23 he was to get RX for his handicap placard. Pt stated he did not receive it. He is asking for Rx he will  when it is ready. Please advise.   Last seen 4/5/2023  Next appt 10/12/2023

## 2023-04-18 NOTE — TELEPHONE ENCOUNTER
Patient called he needs this RX to go to [facility] his new mail order pharmacy.   Last seen 4/5/2023  Next appt 10/12/2023  [facility]

## 2023-08-19 DIAGNOSIS — I10 ESSENTIAL HYPERTENSION: ICD-10-CM

## 2023-08-21 RX ORDER — AMLODIPINE BESYLATE 5 MG/1
5 TABLET ORAL DAILY
Qty: 90 TABLET | Refills: 0 | Status: SHIPPED | OUTPATIENT
Start: 2023-08-21

## 2023-10-17 ENCOUNTER — OFFICE VISIT (OUTPATIENT)
Dept: FAMILY MEDICINE CLINIC | Age: 88
End: 2023-10-17
Payer: MEDICARE

## 2023-10-17 VITALS
DIASTOLIC BLOOD PRESSURE: 86 MMHG | WEIGHT: 199.5 LBS | HEIGHT: 70 IN | RESPIRATION RATE: 16 BRPM | OXYGEN SATURATION: 99 % | HEART RATE: 76 BPM | BODY MASS INDEX: 28.56 KG/M2 | SYSTOLIC BLOOD PRESSURE: 150 MMHG | TEMPERATURE: 98.3 F

## 2023-10-17 DIAGNOSIS — I10 ESSENTIAL HYPERTENSION: Primary | ICD-10-CM

## 2023-10-17 DIAGNOSIS — N52.9 ED (ERECTILE DYSFUNCTION) OF ORGANIC ORIGIN: ICD-10-CM

## 2023-10-17 DIAGNOSIS — M31.6 TEMPORAL ARTERITIS (HCC): ICD-10-CM

## 2023-10-17 PROCEDURE — 99214 OFFICE O/P EST MOD 30 MIN: CPT | Performed by: FAMILY MEDICINE

## 2023-10-17 PROCEDURE — 1123F ACP DISCUSS/DSCN MKR DOCD: CPT | Performed by: FAMILY MEDICINE

## 2023-10-17 RX ORDER — DORZOLAMIDE HYDROCHLORIDE AND TIMOLOL MALEATE 20; 5 MG/ML; MG/ML
SOLUTION/ DROPS OPHTHALMIC
COMMUNITY
Start: 2023-10-06

## 2023-10-17 NOTE — PROGRESS NOTES
[address] [place] [address], [address] [place] [address], [address] [place] [address]  Phone: [phone]        Patient:  Louise Juarez 80 y.o. male          Chief complaint:   Chief Complaint   Patient presents with    6 Month Follow-Up    Results     Pt is here to review most recent labs       Assessment and Plan     Monica Boogie was seen today for 6 month follow-up and results. Diagnoses and all orders for this visit:    Essential hypertension  Not well controlled today  Compliant medications  To come back for BP recheck in 1 to 2 weeks with nurse  For now continue current medications with metoprolol and amlodipine  DASH diet recommended    Temporal arteritis (HCC)  Resolved  Previously followed with Dr. Sammi Arenas for rheumatology and still following with ophthalmology  Not currently on any steroid medication  Continue eyedrops per ophthalmology recommendations    ED (erectile dysfunction) of organic origin  Stable  Continue Viagra        Please see Patient Instructions for further counseling and information given. Advised to please be adherent to the treatment plans discussed today, and please call with any questions or concerns, letting the office know of any reasons that the plans may not be followed. The risks of untreated conditions include worsening illness, injury, disability, and possibly, death. Please call if symptoms change in any way, worsen, or fail to completely resolve, as this could necessitate a change to treatment plans. Patient and/or caregiver expressed understanding. Indications and proper use of medication(s) reviewed. Potential side-effects and risks of medication(s) also explained. Patient and/or caregiver was instructed to call if any new symptoms develop prior to next visit. Health risk factors discussed and addressed. Return to Office: Return in about 6 months (around 4/17/2024) for HTN and routine follow up with PCP. History of Present Illness   The patient is a 80 y.o.

## 2023-10-31 ENCOUNTER — TELEPHONE (OUTPATIENT)
Dept: FAMILY MEDICINE CLINIC | Age: 88
End: 2023-10-31

## 2023-10-31 ENCOUNTER — NURSE ONLY (OUTPATIENT)
Dept: FAMILY MEDICINE CLINIC | Age: 88
End: 2023-10-31

## 2023-10-31 VITALS — HEART RATE: 66 BPM | DIASTOLIC BLOOD PRESSURE: 78 MMHG | SYSTOLIC BLOOD PRESSURE: 138 MMHG

## 2023-10-31 DIAGNOSIS — I10 ESSENTIAL HYPERTENSION: Primary | ICD-10-CM

## 2023-10-31 NOTE — TELEPHONE ENCOUNTER
Patient stopped in for a blood pressure check. 148/80, P-66  I check about 10 minutes later  138/78, P- 66    Patieint denies any chest pain, SOB, or palpitations    He is currently taking amlodipine 5 mg once daily and toprol XL 25 mg once daily. Thiago Espinal

## 2023-11-12 DIAGNOSIS — I10 PRIMARY HYPERTENSION: ICD-10-CM

## 2023-11-12 DIAGNOSIS — I10 ESSENTIAL HYPERTENSION: ICD-10-CM

## 2023-11-13 RX ORDER — AMLODIPINE BESYLATE 5 MG/1
5 TABLET ORAL DAILY
Qty: 90 TABLET | Refills: 3 | Status: SHIPPED | OUTPATIENT
Start: 2023-11-13

## 2023-11-13 RX ORDER — METOPROLOL SUCCINATE 25 MG/1
25 TABLET, EXTENDED RELEASE ORAL DAILY
Qty: 90 TABLET | Refills: 3 | Status: SHIPPED | OUTPATIENT
Start: 2023-11-13

## 2024-01-31 DIAGNOSIS — N52.9 ED (ERECTILE DYSFUNCTION) OF ORGANIC ORIGIN: ICD-10-CM

## 2024-01-31 DIAGNOSIS — Z12.5 SCREENING FOR PROSTATE CANCER: ICD-10-CM

## 2024-01-31 DIAGNOSIS — I71.21 ANEURYSM OF ASCENDING AORTA WITHOUT RUPTURE (HCC): ICD-10-CM

## 2024-01-31 DIAGNOSIS — E78.00 ELEVATED LDL CHOLESTEROL LEVEL: ICD-10-CM

## 2024-01-31 DIAGNOSIS — I10 ESSENTIAL HYPERTENSION: Primary | ICD-10-CM

## 2024-01-31 DIAGNOSIS — I10 PRIMARY HYPERTENSION: ICD-10-CM

## 2024-01-31 RX ORDER — SILDENAFIL 100 MG/1
100 TABLET, FILM COATED ORAL SEE ADMIN INSTRUCTIONS
Qty: 30 TABLET | Refills: 1 | Status: SHIPPED | OUTPATIENT
Start: 2024-01-31 | End: 2024-03-01

## 2024-01-31 NOTE — TELEPHONE ENCOUNTER
----- Message from Deneen Pacheco sent at 1/31/2024 11:20 AM EST -----  Subject: Refill Request    QUESTIONS  Name of Medication? amLODIPine (NORVASC) 5 MG tablet--Rx sent 11/13/23 for #90 w/3 ref  Patient-reported dosage and instructions? 1/day  How many days do you have left? 1  Preferred Pharmacy? [facility]  Pharmacy phone number (if available)? [phone]  ---------------------------------------------------------------------------  --------------,  Name of Medication? metoprolol succinate (TOPROL XL) 25 MG extended   release tablet--Rx sent 11/13/23 for #90 w/3 ref  Patient-reported dosage and instructions? 1/day  How many days do you have left? 1  Preferred Pharmacy? [facility]  Pharmacy phone number (if available)? [phone]  ---------------------------------------------------------------------------  --------------,  Name of Medication? sildenafil (VIAGRA) 100 MG tablet  Patient-reported dosage and instructions? 1 as needed  How many days do you have left? 1  Preferred Pharmacy? [facility]  Pharmacy phone number (if available)? [phone]  ---------------------------------------------------------------------------  --------------  CALL BACK INFO  What is the best way for the office to contact you? OK to leave message on   voicemail  Preferred Call Back Phone Number? [phone]  ---------------------------------------------------------------------------  --------------  SCRIPT ANSWERS  Relationship to Patient? Self

## 2024-01-31 NOTE — TELEPHONE ENCOUNTER
----- Message from Deneen Pacheco sent at 1/31/2024 11:08 AM EST -----  Subject: Message to Provider    QUESTIONS  Information for Provider? Patient requests call back to schedule lab   appointment prior to his 4/16 appointment.   ---------------------------------------------------------------------------  --------------  CALL BACK INFO  [phone]; OK to leave message on voicemail  ---------------------------------------------------------------------------  --------------  SCRIPT ANSWERS  Relationship to Patient? Self

## 2024-02-15 DIAGNOSIS — E78.00 ELEVATED LDL CHOLESTEROL LEVEL: ICD-10-CM

## 2024-02-15 DIAGNOSIS — I71.21 ANEURYSM OF ASCENDING AORTA WITHOUT RUPTURE (HCC): ICD-10-CM

## 2024-02-15 DIAGNOSIS — I10 ESSENTIAL HYPERTENSION: ICD-10-CM

## 2024-02-15 DIAGNOSIS — Z12.5 SCREENING FOR PROSTATE CANCER: ICD-10-CM

## 2024-02-15 LAB
ABSOLUTE IMMATURE GRANULOCYTE: <0.03 K/UL (ref 0–0.58)
ALBUMIN SERPL-MCNC: 4.1 G/DL (ref 3.5–5.2)
ALP BLD-CCNC: 36 U/L (ref 40–129)
ALT SERPL-CCNC: 12 U/L (ref 0–40)
ANION GAP SERPL CALCULATED.3IONS-SCNC: 13 MMOL/L (ref 7–16)
AST SERPL-CCNC: 18 U/L (ref 0–39)
BASOPHILS ABSOLUTE: 0.05 K/UL (ref 0–0.2)
BASOPHILS RELATIVE PERCENT: 1 % (ref 0–2)
BILIRUB SERPL-MCNC: 0.6 MG/DL (ref 0–1.2)
BUN BLDV-MCNC: 14 MG/DL (ref 6–23)
CALCIUM SERPL-MCNC: 8.8 MG/DL (ref 8.6–10.2)
CHLORIDE BLD-SCNC: 105 MMOL/L (ref 98–107)
CHOLESTEROL, FASTING: 195 MG/DL
CO2: 24 MMOL/L (ref 22–29)
CREAT SERPL-MCNC: 0.9 MG/DL (ref 0.7–1.2)
EOSINOPHILS ABSOLUTE: 0.29 K/UL (ref 0.05–0.5)
EOSINOPHILS RELATIVE PERCENT: 5 % (ref 0–6)
GFR SERPL CREATININE-BSD FRML MDRD: >60 ML/MIN/1.73M2
GLUCOSE BLD-MCNC: 92 MG/DL (ref 74–99)
HCT VFR BLD CALC: 42.6 % (ref 37–54)
HDLC SERPL-MCNC: 70 MG/DL
HEMOGLOBIN: 14.1 G/DL (ref 12.5–16.5)
IMMATURE GRANULOCYTES: 0 % (ref 0–5)
LDL CHOLESTEROL: 111 MG/DL
LYMPHOCYTES ABSOLUTE: 0.91 K/UL (ref 1.5–4)
LYMPHOCYTES RELATIVE PERCENT: 16 % (ref 20–42)
MCH RBC QN AUTO: 30.2 PG (ref 26–35)
MCHC RBC AUTO-ENTMCNC: 33.1 G/DL (ref 32–34.5)
MCV RBC AUTO: 91.2 FL (ref 80–99.9)
MONOCYTES ABSOLUTE: 0.71 K/UL (ref 0.1–0.95)
MONOCYTES RELATIVE PERCENT: 13 % (ref 2–12)
NEUTROPHILS ABSOLUTE: 3.6 K/UL (ref 1.8–7.3)
NEUTROPHILS RELATIVE PERCENT: 65 % (ref 43–80)
PDW BLD-RTO: 13.5 % (ref 11.5–15)
PLATELET # BLD: 189 K/UL (ref 130–450)
PMV BLD AUTO: 9.8 FL (ref 7–12)
POTASSIUM SERPL-SCNC: 4.3 MMOL/L (ref 3.5–5)
PROSTATE SPECIFIC ANTIGEN: 2.58 NG/ML (ref 0–4)
RBC # BLD: 4.67 M/UL (ref 3.8–5.8)
SODIUM BLD-SCNC: 142 MMOL/L (ref 132–146)
TOTAL PROTEIN: 6.9 G/DL (ref 6.4–8.3)
TRIGLYCERIDE, FASTING: 70 MG/DL
VLDLC SERPL CALC-MCNC: 14 MG/DL
WBC # BLD: 5.6 K/UL (ref 4.5–11.5)

## 2024-03-11 ENCOUNTER — TELEPHONE (OUTPATIENT)
Dept: FAMILY MEDICINE CLINIC | Age: 89
End: 2024-03-11

## 2024-03-11 NOTE — TELEPHONE ENCOUNTER
----- Message from Susanne Lopez Jeremywang sent at 3/11/2024  2:35 PM EDT -----  Subject: Results Request    QUESTIONS  Results: Comprehensive Metabolic Panel and all others this date; Ordered   by: Henrry Sandoval   Date Performed: 2024-02-15  ---------------------------------------------------------------------------  --------------  CALL BACK INFO    [phone]; OK to leave message on voicemail  ---------------------------------------------------------------------------  --------------

## 2024-04-16 ENCOUNTER — OFFICE VISIT (OUTPATIENT)
Dept: FAMILY MEDICINE CLINIC | Age: 89
End: 2024-04-16
Payer: MEDICARE

## 2024-04-16 VITALS
OXYGEN SATURATION: 100 % | TEMPERATURE: 97.5 F | HEART RATE: 68 BPM | DIASTOLIC BLOOD PRESSURE: 92 MMHG | HEIGHT: 70 IN | SYSTOLIC BLOOD PRESSURE: 148 MMHG | WEIGHT: 201 LBS | BODY MASS INDEX: 28.77 KG/M2

## 2024-04-16 DIAGNOSIS — H34.8320 BRANCH RETINAL VEIN OCCLUSION OF LEFT EYE WITH MACULAR EDEMA: ICD-10-CM

## 2024-04-16 DIAGNOSIS — Z00.00 MEDICARE ANNUAL WELLNESS VISIT, SUBSEQUENT: Primary | ICD-10-CM

## 2024-04-16 DIAGNOSIS — Z13.31 SCREENING FOR DEPRESSION: ICD-10-CM

## 2024-04-16 DIAGNOSIS — H40.1130 PRIMARY OPEN ANGLE GLAUCOMA OF BOTH EYES, UNSPECIFIED GLAUCOMA STAGE: ICD-10-CM

## 2024-04-16 DIAGNOSIS — H53.9 VISION CHANGES: ICD-10-CM

## 2024-04-16 DIAGNOSIS — I71.21 ANEURYSM OF ASCENDING AORTA WITHOUT RUPTURE (HCC): ICD-10-CM

## 2024-04-16 DIAGNOSIS — H35.3132 NONEXUDATIVE AGE-RELATED MACULAR DEGENERATION, BILATERAL, INTERMEDIATE DRY STAGE: ICD-10-CM

## 2024-04-16 DIAGNOSIS — I10 ESSENTIAL HYPERTENSION: ICD-10-CM

## 2024-04-16 DIAGNOSIS — H18.413 ARCUS SENILIS OF BOTH EYES: ICD-10-CM

## 2024-04-16 DIAGNOSIS — N52.9 ED (ERECTILE DYSFUNCTION) OF ORGANIC ORIGIN: ICD-10-CM

## 2024-04-16 PROBLEM — M31.6 TEMPORAL ARTERITIS (HCC): Status: RESOLVED | Noted: 2022-04-12 | Resolved: 2024-04-16

## 2024-04-16 PROCEDURE — G0439 PPPS, SUBSEQ VISIT: HCPCS | Performed by: SURGERY

## 2024-04-16 PROCEDURE — 1123F ACP DISCUSS/DSCN MKR DOCD: CPT | Performed by: SURGERY

## 2024-04-16 RX ORDER — BENAZEPRIL HYDROCHLORIDE 10 MG/1
10 TABLET ORAL DAILY
Qty: 30 TABLET | Refills: 11 | Status: SHIPPED | OUTPATIENT
Start: 2024-04-16

## 2024-04-16 SDOH — ECONOMIC STABILITY: FOOD INSECURITY: WITHIN THE PAST 12 MONTHS, THE FOOD YOU BOUGHT JUST DIDN'T LAST AND YOU DIDN'T HAVE MONEY TO GET MORE.: NEVER TRUE

## 2024-04-16 SDOH — ECONOMIC STABILITY: FOOD INSECURITY: WITHIN THE PAST 12 MONTHS, YOU WORRIED THAT YOUR FOOD WOULD RUN OUT BEFORE YOU GOT MONEY TO BUY MORE.: NEVER TRUE

## 2024-04-16 SDOH — ECONOMIC STABILITY: INCOME INSECURITY: HOW HARD IS IT FOR YOU TO PAY FOR THE VERY BASICS LIKE FOOD, HOUSING, MEDICAL CARE, AND HEATING?: NOT HARD AT ALL

## 2024-04-16 ASSESSMENT — PATIENT HEALTH QUESTIONNAIRE - PHQ9
SUM OF ALL RESPONSES TO PHQ QUESTIONS 1-9: 2
SUM OF ALL RESPONSES TO PHQ QUESTIONS 1-9: 2
8. MOVING OR SPEAKING SO SLOWLY THAT OTHER PEOPLE COULD HAVE NOTICED. OR THE OPPOSITE, BEING SO FIGETY OR RESTLESS THAT YOU HAVE BEEN MOVING AROUND A LOT MORE THAN USUAL: NOT AT ALL
1. LITTLE INTEREST OR PLEASURE IN DOING THINGS: NOT AT ALL
6. FEELING BAD ABOUT YOURSELF - OR THAT YOU ARE A FAILURE OR HAVE LET YOURSELF OR YOUR FAMILY DOWN: NOT AT ALL
7. TROUBLE CONCENTRATING ON THINGS, SUCH AS READING THE NEWSPAPER OR WATCHING TELEVISION: NOT AT ALL
SUM OF ALL RESPONSES TO PHQ9 QUESTIONS 1 & 2: 0
9. THOUGHTS THAT YOU WOULD BE BETTER OFF DEAD, OR OF HURTING YOURSELF: NOT AT ALL
3. TROUBLE FALLING OR STAYING ASLEEP: NOT AT ALL
SUM OF ALL RESPONSES TO PHQ QUESTIONS 1-9: 2
SUM OF ALL RESPONSES TO PHQ QUESTIONS 1-9: 2
4. FEELING TIRED OR HAVING LITTLE ENERGY: SEVERAL DAYS
10. IF YOU CHECKED OFF ANY PROBLEMS, HOW DIFFICULT HAVE THESE PROBLEMS MADE IT FOR YOU TO DO YOUR WORK, TAKE CARE OF THINGS AT HOME, OR GET ALONG WITH OTHER PEOPLE: NOT DIFFICULT AT ALL
5. POOR APPETITE OR OVEREATING: SEVERAL DAYS

## 2024-04-16 ASSESSMENT — LIFESTYLE VARIABLES
HOW MANY STANDARD DRINKS CONTAINING ALCOHOL DO YOU HAVE ON A TYPICAL DAY: 1 OR 2
HOW OFTEN DO YOU HAVE A DRINK CONTAINING ALCOHOL: MONTHLY OR LESS

## 2024-04-16 NOTE — PROGRESS NOTES
Medicare Annual Wellness Visit    Edward Patton is here for Medicare AWV and Health Maintenance (Due(RSV vaccine) )    Assessment & Plan   Medicare annual wellness visit, subsequent  All personal, family, social, surgical, medical history is reviewed and updated with patient.  Allergies, medications updated.  List of specialists follows with updated.  DM/HM updated.    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  Recommended screening schedule for the next 5-10 years is provided to the patient in written form: see Patient Instructions/AVS.     Return in 6 months (on 10/16/2024) for Medicare Annual Wellness Visit in 1 year, physical exam .     Subjective         1. Primary open angle glaucoma of both eyes, unspecified glaucoma stage / 2. Nonexudative age-related macular degeneration, bilateral, intermediate dry stage    -has another appointment coming up in 3 to 4 days  -Brimonidine  -Latanoprostene  -Dorzalamide     INTERVAL            3. ED (erectile dysfunction) of organic origin  -no hypotension  -no visual

## 2024-04-26 ENCOUNTER — TELEPHONE (OUTPATIENT)
Dept: FAMILY MEDICINE CLINIC | Age: 89
End: 2024-04-26

## 2024-04-26 DIAGNOSIS — I10 PRIMARY HYPERTENSION: Primary | ICD-10-CM

## 2024-04-26 DIAGNOSIS — I70.1 RENAL ARTERY STENOSIS (HCC): ICD-10-CM

## 2024-04-26 DIAGNOSIS — I1A.0 RESISTANT HYPERTENSION: Primary | ICD-10-CM

## 2024-04-26 RX ORDER — SPIRONOLACTONE 25 MG/1
12.5 TABLET ORAL DAILY
Qty: 45 TABLET | Refills: 1 | Status: SHIPPED | OUTPATIENT
Start: 2024-04-26

## 2024-04-26 NOTE — TELEPHONE ENCOUNTER
Per Dr. Sandoval -  Stop taking the benazepril  Get BMP done  Get ultrasound of kidneys done  See the nephrologist  Never take medications prescribed to others    Sent in spironolactone for him to take instead  I ordered all others     I called patient and informed him, as noted by Dr. Sandoval.  Patient verbalized understanding and was agreeable.  I transferred patient to Central Scheduling to make appt for US kidneys.

## 2024-04-26 NOTE — TELEPHONE ENCOUNTER
Patient called stating since being put on Benazepril 10 mg, his BP has been running high and he's asking for advisement.     4/18 - 128/63  4/19 - 149/74  4/24 - 154/85  4/25 - 139/78  4/26 - 160/99; 185/98; 172/96    Patient informed me that on Wednesday he felt a little dizzy when he was at a store.  He stated he drank a little water and then drove home, checked his BP and it was normal.  I advised patient not to be driving when he's dizzy.  I informed patient that I will send a msg to Dr. Sandoval for advisement.  I informed patient that he has a Nurse visit 4/30/24 for BP check and advised him to bring log and BP cuff.    Patient's girlfriend was with him and got on the phone, stating patient is very anxious and she wanted to know if she can give him one of her anti-anxiety pills.  I advised her not to give patient any medication that is not prescribed to him.  She was agreeable.    [place] - [phone]    Last seen 4/16/2024  Next appt 4/30/2024  [facility]

## 2024-04-30 ENCOUNTER — NURSE ONLY (OUTPATIENT)
Dept: FAMILY MEDICINE CLINIC | Age: 89
End: 2024-04-30

## 2024-04-30 ENCOUNTER — TELEPHONE (OUTPATIENT)
Dept: FAMILY MEDICINE CLINIC | Age: 89
End: 2024-04-30

## 2024-04-30 VITALS — DIASTOLIC BLOOD PRESSURE: 96 MMHG | HEART RATE: 70 BPM | SYSTOLIC BLOOD PRESSURE: 144 MMHG

## 2024-04-30 DIAGNOSIS — I10 ESSENTIAL HYPERTENSION: Primary | ICD-10-CM

## 2024-04-30 NOTE — TELEPHONE ENCOUNTER
Blood pressure check   Office- 158/98 P 75, 144/96 P 70    Patient denies chest pain, palpitations, SOB    Home BP readings were written on a pharmacy bag, not all dated.   4/30/24- 143/98 P 71, 143/96 P 70  Date unknown- 126/88 P 67, 112/78 P 70 (patient believes these readings are when he took Spironolactone)     Per patient he took Benazepril 10 mg 1/2 tab today and he believes yesterday as well instead of Spironolactone 25 mg by mistake.

## 2024-04-30 NOTE — PROGRESS NOTES
Blood pressure check   Office- 158/98 P 75, 144/96 P 70    Patient denies chest pain, palpitations, SOB

## 2024-05-03 ENCOUNTER — TELEPHONE (OUTPATIENT)
Dept: FAMILY MEDICINE CLINIC | Age: 89
End: 2024-05-03

## 2024-05-03 ENCOUNTER — NURSE ONLY (OUTPATIENT)
Dept: FAMILY MEDICINE CLINIC | Age: 89
End: 2024-05-03

## 2024-05-03 DIAGNOSIS — I10 ESSENTIAL HYPERTENSION: Primary | ICD-10-CM

## 2024-05-03 NOTE — TELEPHONE ENCOUNTER
Blood pressure check   Office- 158/90 P 71, 152/90 P 74  Home monitor- 164/102 P 68    Patient called the office and requesting to come in to have his blood pressure monitor checked with our reading  He was concerned because his bp reading at home was 180/11 P 62, 170/111 P 61, 166/101 P62    Only took spironolactone today.  Patient has not taken his metoprolol and amlodipine since 4/30/2024.  He thought he was only to take spirolactone.    Patient denies chest pain, palpitations, SOB

## 2024-05-03 NOTE — PROGRESS NOTES
Blood pressure check   Office- 158/90 P 71, 152/90 P 74  Home monitor- 164/102 P 68    Patient called the office requesting to come in to have his blood pressure monitor checked with our reading  He was concerned because his bp reading at home was 180/11 P 62, 170/111 P 61, 166/101 P62    Only took spironolactone today.  Patient has not taken his metoprolol and amlodipine since 4/30/2024.  He thought he was only to take spirolactone.    Patient denies chest pain, palpitations, SOB

## 2024-05-06 ENCOUNTER — TELEPHONE (OUTPATIENT)
Dept: FAMILY MEDICINE CLINIC | Age: 89
End: 2024-05-06

## 2024-05-06 NOTE — TELEPHONE ENCOUNTER
C/o hypertension    \" I just dont feel right\" like my body is not there after taking spironolactone   Worse when he goes from sitting to standing position  I tried to ask patient if he felt dizzy or lightheaded. He states its not quite that, but not feeling right.     Reported Blood pressure readings:  (5/5/2024) 173/103, 154/87 P 59, 147/83  (5/6/24) 146/95 P 62    Patient states be has been taking all of his medications(metoprolol, amlodipine, and spironolactone)     He denies chest pain, palpitations, SOB, lightheadedness, dizziness, visual disturbance, headache, fatigue

## 2024-05-08 NOTE — TELEPHONE ENCOUNTER
Left detailed message to notify patient. Appointment scheduled 5/10/24 at 1020, patient to call back to confirm   Patient position supine. Patient prepped and draped per unit standard. Safety straps applied: Yes

## 2024-05-09 ENCOUNTER — TELEPHONE (OUTPATIENT)
Dept: FAMILY MEDICINE CLINIC | Age: 89
End: 2024-05-09

## 2024-05-09 ENCOUNTER — NURSE ONLY (OUTPATIENT)
Dept: FAMILY MEDICINE CLINIC | Age: 89
End: 2024-05-09

## 2024-05-09 VITALS — SYSTOLIC BLOOD PRESSURE: 138 MMHG | HEART RATE: 70 BPM | DIASTOLIC BLOOD PRESSURE: 78 MMHG

## 2024-05-09 DIAGNOSIS — I10 ESSENTIAL HYPERTENSION: Primary | ICD-10-CM

## 2024-05-09 NOTE — PROGRESS NOTES
Blood pressure check   Office reading-138/78 P 70   Home monitor-136/81 P 68    Reported home blood pressure readings:   5/8/2024-166/105 morning before taking medication  125/84 P 69, 107/74 P 67 after taking a walk before medication  163/95 P 61 2 hours after taking a walk(no medicine)  Patient then took all 3 pills around 1130 am.     5/9/2024-172/111 P 61,154/100 P 65, 169/105 P 63 this morning before taking pills   Pills taken today around 830 am    Patient denies chest pain, vision changes, palpitations, SOB, headaches     Patient has an appointment tomorrow 5/10/24 at 10:20am

## 2024-05-09 NOTE — TELEPHONE ENCOUNTER
Left message for patient to call back to notify if he received call regarding appointment and if he has any signs and symptoms listed  to report to ED

## 2024-05-10 ENCOUNTER — OFFICE VISIT (OUTPATIENT)
Dept: FAMILY MEDICINE CLINIC | Age: 89
End: 2024-05-10
Payer: MEDICARE

## 2024-05-10 VITALS
RESPIRATION RATE: 18 BRPM | OXYGEN SATURATION: 97 % | HEIGHT: 70 IN | BODY MASS INDEX: 28 KG/M2 | WEIGHT: 195.6 LBS | HEART RATE: 73 BPM | SYSTOLIC BLOOD PRESSURE: 148 MMHG | TEMPERATURE: 98.2 F | DIASTOLIC BLOOD PRESSURE: 92 MMHG

## 2024-05-10 DIAGNOSIS — I10 ESSENTIAL HYPERTENSION: ICD-10-CM

## 2024-05-10 PROCEDURE — 1036F TOBACCO NON-USER: CPT | Performed by: FAMILY MEDICINE

## 2024-05-10 PROCEDURE — 1123F ACP DISCUSS/DSCN MKR DOCD: CPT | Performed by: FAMILY MEDICINE

## 2024-05-10 PROCEDURE — 99214 OFFICE O/P EST MOD 30 MIN: CPT | Performed by: FAMILY MEDICINE

## 2024-05-10 PROCEDURE — G8419 CALC BMI OUT NRM PARAM NOF/U: HCPCS | Performed by: FAMILY MEDICINE

## 2024-05-10 PROCEDURE — G8427 DOCREV CUR MEDS BY ELIG CLIN: HCPCS | Performed by: FAMILY MEDICINE

## 2024-05-10 RX ORDER — AMLODIPINE BESYLATE 5 MG/1
5 TABLET ORAL 2 TIMES DAILY
Qty: 180 TABLET | Refills: 3 | Status: SHIPPED | OUTPATIENT
Start: 2024-05-10

## 2024-05-10 NOTE — PROGRESS NOTES
[facility]  [address], [place] [address]  Phone: [phone]        Patient:  Edward Patton 89 y.o. male          Chief complaint:   Chief Complaint   Patient presents with    Hypertension     Pt states his BP before medications this morning was 185/100.  Pt denies chest pain and palpitations       Assessment and Plan     Edward was seen today for hypertension.    Diagnoses and all orders for this visit:    Essential hypertension  Chronic and not controlled  Asymptomatic  Trial increase amlodipine to BID  -     amLODIPine (NORVASC) 5 MG tablet; Take 1 tablet by mouth in the morning and at bedtime  Continue spironolactone and metoprolol  Continue monitoring home BP  2 week BP check in office        Please see Patient Instructions for further counseling and information given.        Advised to please be adherent to the treatment plans discussed today, and please call with any questions or concerns, letting the office know of any reasons that the plans may not be followed.  The risks of untreated conditions include worsening illness, injury, disability, and possibly, death. Please call if symptoms change in any way, worsen, or fail to completely resolve, as this could necessitate a change to treatment plans. Patient and/or caregiver expressed understanding.      Indications and proper use of medication(s) reviewed.  Potential side-effects and risks of medication(s) also explained.  Patient and/or caregiver was instructed to call if any new symptoms develop prior to next visit.     Health risk factors discussed and addressed.     Return to Office: Return for Already scheduled.    History of Present Illness   The patient is a 89 y.o. male  who presents to the clinic for the following medical concerns:    HTN:  Home Bps sometimes >100 diastolic at home before meds. Did take his meds today and still elevated. Not eating high sodium food. Actually noticed it decrease after he walked a

## 2024-05-10 NOTE — PATIENT INSTRUCTIONS
[email or web address] [email or web address].[email or web address] and enter H967 to learn more about \"DASH Diet: Care Instructions.\"  Current as of: September 20, 2023               Content Version: 14.0  © 4037-2213 [email or web address].   Care instructions adapted under license by [employer]. If you have questions about a medical condition or this instruction, always ask your healthcare professional. [email or web address] disclaims any warranty or liability for your use of this information.

## 2024-05-28 ENCOUNTER — NURSE ONLY (OUTPATIENT)
Dept: FAMILY MEDICINE CLINIC | Age: 89
End: 2024-05-28

## 2024-05-28 ENCOUNTER — TELEPHONE (OUTPATIENT)
Dept: FAMILY MEDICINE CLINIC | Age: 89
End: 2024-05-28

## 2024-05-28 VITALS — SYSTOLIC BLOOD PRESSURE: 152 MMHG | HEART RATE: 76 BPM | DIASTOLIC BLOOD PRESSURE: 86 MMHG

## 2024-05-28 DIAGNOSIS — I10 ESSENTIAL HYPERTENSION: Primary | ICD-10-CM

## 2024-05-28 NOTE — PROGRESS NOTES
Blood pressure check   Office readings-144/82 P 67, 152/86 P 76    Patient has not taken his blood pressure medications for about 2 weeks   He stated he stopped taking them a few days after his last visit here(5/10/2024) because he was getting dizzy.      Patient denies chest pain, palpitations, SOB, visual disturbances, dizziness.    Home bp readings scanned into chart

## 2024-05-28 NOTE — TELEPHONE ENCOUNTER
Blood pressure check   Office readings-144/82 P 67, 152/84 P 76    Patient has not taken his blood pressure medications for about 2 weeks   He stated he stopped taking them a few days after his last visit here(5/10/2024) because he was getting dizzy.      Patient denies chest pain, palpitations, SOB, visual disturbances, dizziness.    Home bp readings scanned into chart

## 2024-05-29 NOTE — TELEPHONE ENCOUNTER
Patient called the [facility] to inform that he had his BP checked today and it was 117/74.  Patient informed me that he stopped taking his meds because he felt dizzy.  I noted msg from Dr. Sandoval and that Juanis LOVELL MA left a msg for patient, who denied getting the msg.  I informed him per Dr. Sandoval,   Okay   Recommend he take his bp medications as directed   Not doing so risks stroke     Ensure he has apt set up for the nephrologist please     Patient stated he did not like getting dizzy and feels better off of the medication.  I informed patient that sometimes the benefits of taking RX outweigh the risks and he should follow Dr. Sandoval's advisement.  Patient asked why he has to wait to make appt w/[facility] and I informed him that MA spoke w/that [facility] and apparently they are scheduling out.  I asked patient if he would like to speak w/MA since she was assisting patient with this and he requested to be transferred to her.

## 2024-05-29 NOTE — TELEPHONE ENCOUNTER
Left message for patient.  [phone][facility]  They have the referral awaiting new patient appointments when doctors schedule is open. Notified the patient will be scheduled 1.5 months in advance when doctors schedule becomes open.

## 2024-05-29 NOTE — TELEPHONE ENCOUNTER
Spoke with patient he does not want to have to wait over a month to get an appointment with the nephrologist. He asked if Dr Sandoval has another nephrologist that her could refer him to.

## 2024-05-30 NOTE — TELEPHONE ENCOUNTER
Patient did not bring in his monitor.   I did give patient a few pages of our BP log.   Patient notified to check BP daily and log readings on the log that were given

## 2024-07-02 ENCOUNTER — TELEPHONE (OUTPATIENT)
Dept: PHARMACY | Facility: CLINIC | Age: 89
End: 2024-07-02

## 2024-07-02 NOTE — TELEPHONE ENCOUNTER
[facility]: ADHERENCE REVIEW  Identified care gap per [employer]: fills at [facility] : ACE/ARB adherence    ASSESSMENT    ACE/ARB ADHERENCE    Insurance Records claims through 06/21/2024 (Prior Year PDC = not reported; YTD PDC = FIRST FILL; Potential Fail Date: 7/7/24):   Benazepril 10 mg tab last filled on 4/16/24 for 30 day supply. Next refill due: 5/16/24  Benazepril discontinued from med list 4/26/24 by PCP with reason \"side effects\"    Per Reconcile Dispense History:   BENAZEPRIL HCL  10 MG TABS 04/16/2024 30 30 tablet Henrry Sandoval, DO   Last Rx 4/16/24 x 30 day supply 11 refills - want to ensure no longer active at pharmacy    BP Readings from Last 3 Encounters:   05/28/24 (!) 152/86   05/10/24 (!) 148/92   05/09/24 138/78     Estimated Creatinine Clearance: 60 mL/min (based on SCr of 0.9 mg/dL).  Lab Results   Component Value Date    CREATININE 0.9 02/15/2024     Lab Results   Component Value Date    K 4.3 02/15/2024       PLAN  The following are interventions that have been identified:   Called to [facility] to ensure benazepril Rx deactivated to prevent unintentional future fills.     Yeimi Herring, PharmD, [facility]  Population Health Pharmacist  [facility]  Department, toll free: [phone], option 1     For Pharmacy Admin Tracking Only    Program: [facility]  CPA in place:  No  Recommendation Provided To: Pharmacy: 1  Intervention Detail: Discontinued Rx: 1, reason: BYRON  Intervention Accepted By: Pharmacy: 1  Gap Closed?: Yes   Time Spent (min): 15

## 2024-09-04 DIAGNOSIS — I10 PRIMARY HYPERTENSION: ICD-10-CM

## 2024-09-05 RX ORDER — METOPROLOL SUCCINATE 25 MG/1
25 TABLET, EXTENDED RELEASE ORAL DAILY
Qty: 90 TABLET | Refills: 3 | Status: SHIPPED | OUTPATIENT
Start: 2024-09-05

## 2024-09-05 NOTE — TELEPHONE ENCOUNTER
Last seen 5/10/2024  Next appt 10/22/2024    Requested Prescriptions     Pending Prescriptions Disp Refills    metoprolol succinate (TOPROL XL) 25 MG extended release tablet [Pharmacy Med Name: Metoprolol Succinate ER 25 MG Oral Tablet Extended Release 24 Hour] 90 tablet 3     Sig: TAKE 1 TABLET BY MOUTH ONCE  DAILY

## 2025-01-10 ENCOUNTER — TELEPHONE (OUTPATIENT)
Dept: FAMILY MEDICINE CLINIC | Age: 89
End: 2025-01-10

## 2025-01-10 NOTE — TELEPHONE ENCOUNTER
Pt stated when he goes to bed his back, where his kidney is, hurts. When he is up during the day, there is no pain. This has been going on for the past 4 days. He would like to be advised what to do about the pain. He takes OTC motrin and ibuprofen to help with the pain. He said that he was going take a bath with Epson salt to see if that helps.    Last seen 1/9/2025  Next appt 4/22/2025

## 2025-01-10 NOTE — TELEPHONE ENCOUNTER
Per Dr. Sandoval-  Continue to take over the counter pain relief with tylenol, follow the directions on the bottle.  Make an office visit to evaluate further.  If he does not wish to wait for the next appointment, come to walk in or urgent care.     He did not show to his appointment yesterday - it would've been a good time to have him in the office to evaluate this complaint and make recommendations for him.     Called pt to advise PCP note. He apologized for missing his appt.

## 2025-03-11 ENCOUNTER — OFFICE VISIT (OUTPATIENT)
Dept: FAMILY MEDICINE CLINIC | Age: 89
End: 2025-03-11
Payer: MEDICARE

## 2025-03-11 VITALS
WEIGHT: 191 LBS | HEIGHT: 70 IN | BODY MASS INDEX: 27.35 KG/M2 | OXYGEN SATURATION: 99 % | HEART RATE: 78 BPM | DIASTOLIC BLOOD PRESSURE: 80 MMHG | RESPIRATION RATE: 16 BRPM | SYSTOLIC BLOOD PRESSURE: 128 MMHG

## 2025-03-11 DIAGNOSIS — R19.09 MASS OF RIGHT INGUINAL REGION: Primary | ICD-10-CM

## 2025-03-11 DIAGNOSIS — R19.09 MASS OF RIGHT INGUINAL REGION: ICD-10-CM

## 2025-03-11 LAB
ALBUMIN: 4.4 G/DL (ref 3.5–5.2)
ALP BLD-CCNC: 41 U/L (ref 40–129)
ALT SERPL-CCNC: 17 U/L (ref 0–40)
ANION GAP SERPL CALCULATED.3IONS-SCNC: 14 MMOL/L (ref 7–16)
AST SERPL-CCNC: 21 U/L (ref 0–39)
BASOPHILS ABSOLUTE: 0.05 K/UL (ref 0–0.2)
BASOPHILS RELATIVE PERCENT: 1 % (ref 0–2)
BILIRUB SERPL-MCNC: 0.5 MG/DL (ref 0–1.2)
BUN BLDV-MCNC: 18 MG/DL (ref 6–23)
CALCIUM SERPL-MCNC: 9.2 MG/DL (ref 8.6–10.2)
CHLORIDE BLD-SCNC: 103 MMOL/L (ref 98–107)
CO2: 22 MMOL/L (ref 22–29)
CREAT SERPL-MCNC: 1.1 MG/DL (ref 0.7–1.2)
EOSINOPHILS ABSOLUTE: 0.16 K/UL (ref 0.05–0.5)
EOSINOPHILS RELATIVE PERCENT: 2 % (ref 0–6)
GFR, ESTIMATED: 65 ML/MIN/1.73M2
GLUCOSE BLD-MCNC: 94 MG/DL (ref 74–99)
HCT VFR BLD CALC: 43.4 % (ref 37–54)
HEMOGLOBIN: 14.4 G/DL (ref 12.5–16.5)
IMMATURE GRANULOCYTES %: 0 % (ref 0–5)
IMMATURE GRANULOCYTES ABSOLUTE: <0.03 K/UL (ref 0–0.58)
LYMPHOCYTES ABSOLUTE: 1.32 K/UL (ref 1.5–4)
LYMPHOCYTES RELATIVE PERCENT: 17 % (ref 20–42)
MCH RBC QN AUTO: 30.4 PG (ref 26–35)
MCHC RBC AUTO-ENTMCNC: 33.2 G/DL (ref 32–34.5)
MCV RBC AUTO: 91.6 FL (ref 80–99.9)
MONOCYTES ABSOLUTE: 0.83 K/UL (ref 0.1–0.95)
MONOCYTES RELATIVE PERCENT: 11 % (ref 2–12)
NEUTROPHILS ABSOLUTE: 5.36 K/UL (ref 1.8–7.3)
NEUTROPHILS RELATIVE PERCENT: 69 % (ref 43–80)
PDW BLD-RTO: 13.5 % (ref 11.5–15)
PLATELET # BLD: 237 K/UL (ref 130–450)
PMV BLD AUTO: 9.8 FL (ref 7–12)
POTASSIUM SERPL-SCNC: 4.7 MMOL/L (ref 3.5–5)
RBC # BLD: 4.74 M/UL (ref 3.8–5.8)
SODIUM BLD-SCNC: 139 MMOL/L (ref 132–146)
TOTAL PROTEIN: 7.1 G/DL (ref 6.4–8.3)
WBC # BLD: 7.7 K/UL (ref 4.5–11.5)

## 2025-03-11 PROCEDURE — 1036F TOBACCO NON-USER: CPT | Performed by: FAMILY MEDICINE

## 2025-03-11 PROCEDURE — G8427 DOCREV CUR MEDS BY ELIG CLIN: HCPCS | Performed by: FAMILY MEDICINE

## 2025-03-11 PROCEDURE — 1123F ACP DISCUSS/DSCN MKR DOCD: CPT | Performed by: FAMILY MEDICINE

## 2025-03-11 PROCEDURE — G8419 CALC BMI OUT NRM PARAM NOF/U: HCPCS | Performed by: FAMILY MEDICINE

## 2025-03-11 PROCEDURE — 99214 OFFICE O/P EST MOD 30 MIN: CPT | Performed by: FAMILY MEDICINE

## 2025-03-11 SDOH — ECONOMIC STABILITY: FOOD INSECURITY: WITHIN THE PAST 12 MONTHS, THE FOOD YOU BOUGHT JUST DIDN'T LAST AND YOU DIDN'T HAVE MONEY TO GET MORE.: PATIENT DECLINED

## 2025-03-11 SDOH — ECONOMIC STABILITY: FOOD INSECURITY: WITHIN THE PAST 12 MONTHS, YOU WORRIED THAT YOUR FOOD WOULD RUN OUT BEFORE YOU GOT MONEY TO BUY MORE.: PATIENT DECLINED

## 2025-03-11 ASSESSMENT — PATIENT HEALTH QUESTIONNAIRE - PHQ9
5. POOR APPETITE OR OVEREATING: NOT AT ALL
8. MOVING OR SPEAKING SO SLOWLY THAT OTHER PEOPLE COULD HAVE NOTICED. OR THE OPPOSITE, BEING SO FIGETY OR RESTLESS THAT YOU HAVE BEEN MOVING AROUND A LOT MORE THAN USUAL: NOT AT ALL
6. FEELING BAD ABOUT YOURSELF - OR THAT YOU ARE A FAILURE OR HAVE LET YOURSELF OR YOUR FAMILY DOWN: NOT AT ALL
10. IF YOU CHECKED OFF ANY PROBLEMS, HOW DIFFICULT HAVE THESE PROBLEMS MADE IT FOR YOU TO DO YOUR WORK, TAKE CARE OF THINGS AT HOME, OR GET ALONG WITH OTHER PEOPLE: NOT DIFFICULT AT ALL
1. LITTLE INTEREST OR PLEASURE IN DOING THINGS: NOT AT ALL
SUM OF ALL RESPONSES TO PHQ QUESTIONS 1-9: 0
2. FEELING DOWN, DEPRESSED OR HOPELESS: NOT AT ALL
9. THOUGHTS THAT YOU WOULD BE BETTER OFF DEAD, OR OF HURTING YOURSELF: NOT AT ALL
SUM OF ALL RESPONSES TO PHQ QUESTIONS 1-9: 0
3. TROUBLE FALLING OR STAYING ASLEEP: NOT AT ALL
7. TROUBLE CONCENTRATING ON THINGS, SUCH AS READING THE NEWSPAPER OR WATCHING TELEVISION: NOT AT ALL
4. FEELING TIRED OR HAVING LITTLE ENERGY: NOT AT ALL

## 2025-03-11 NOTE — PROGRESS NOTES
prior to visit.       Allergies   Allergen Reactions    Brimonidine Tartrate Other (See Comments)     causes eye redness       I have reviewed his allergies, medications, problem list, medical,social and family history and have updated as needed in the electronic medical record.    Objective:     Physical Exam  Vitals and nursing note reviewed.   Constitutional:       General: He is not in acute distress.     Appearance: He is well-developed. He is not diaphoretic.   HENT:      Head: Normocephalic and atraumatic.      Right Ear: External ear normal.      Left Ear: External ear normal.      Nose: Nose normal.      Mouth/Throat:      Pharynx: No oropharyngeal exudate.   Eyes:      General: No scleral icterus.        Right eye: No discharge.         Left eye: No discharge.      Conjunctiva/sclera: Conjunctivae normal.      Pupils: Pupils are equal, round, and reactive to light.   Neck:      Thyroid: No thyromegaly.      Vascular: No JVD.      Trachea: No tracheal deviation.   Cardiovascular:      Rate and Rhythm: Normal rate and regular rhythm.      Heart sounds: Normal heart sounds. No murmur heard.     No friction rub. No gallop.   Pulmonary:      Effort: Pulmonary effort is normal. No respiratory distress.      Breath sounds: Normal breath sounds. No stridor. No wheezing or rales.   Chest:      Chest wall: No tenderness.   Abdominal:      General: Bowel sounds are normal. There is no distension.      Palpations: Abdomen is soft. There is no mass.      Tenderness: There is no abdominal tenderness. There is no guarding or rebound.   Genitourinary:     Comments: Deferred by patient   Musculoskeletal:         General: No tenderness. Normal range of motion.      Cervical back: Normal range of motion and neck supple.   Lymphadenopathy:      Cervical: No cervical adenopathy.   Skin:     General: Skin is warm and dry.      Coloration: Skin is not pale.      Findings: No erythema or rash.   Neurological:      Mental Status:

## 2025-03-14 ASSESSMENT — ENCOUNTER SYMPTOMS
NAUSEA: 0
FACIAL SWELLING: 0
PHOTOPHOBIA: 0
COLOR CHANGE: 0
EYE REDNESS: 0
TROUBLE SWALLOWING: 0
SHORTNESS OF BREATH: 0
EYE PAIN: 0
CHEST TIGHTNESS: 0
COUGH: 0
CHOKING: 0
STRIDOR: 0
RECTAL PAIN: 0
SORE THROAT: 0
APNEA: 0
ANAL BLEEDING: 0
ABDOMINAL DISTENTION: 0
SINUS PRESSURE: 0
BLOOD IN STOOL: 0
WHEEZING: 0
ABDOMINAL PAIN: 0
VOICE CHANGE: 0
CONSTIPATION: 0
EYE ITCHING: 0
DIARRHEA: 0
BACK PAIN: 0
EYE DISCHARGE: 0
RHINORRHEA: 0
VOMITING: 0

## 2025-03-20 ENCOUNTER — TELEPHONE (OUTPATIENT)
Dept: FAMILY MEDICINE CLINIC | Age: 89
End: 2025-03-20

## 2025-03-20 NOTE — TELEPHONE ENCOUNTER
Yaritza with [facility] called to advise they have tried to contact pt multiple times but have been unsuccessful with scheduling him. Yaritza wanted PCP to be aware.

## 2025-03-31 ENCOUNTER — TELEPHONE (OUTPATIENT)
Dept: FAMILY MEDICINE CLINIC | Age: 89
End: 2025-03-31

## 2025-03-31 DIAGNOSIS — K80.20 CALCULUS OF GALLBLADDER WITHOUT CHOLECYSTITIS WITHOUT OBSTRUCTION: ICD-10-CM

## 2025-03-31 DIAGNOSIS — K40.20 BILATERAL INGUINAL HERNIA WITHOUT OBSTRUCTION OR GANGRENE, RECURRENCE NOT SPECIFIED: Primary | ICD-10-CM

## 2025-03-31 NOTE — TELEPHONE ENCOUNTER
Patient called for the results of the CT Scan he had done on 3.27.25    Last seen 3/11/2025  Next appt 4/22/2025

## 2025-04-01 NOTE — TELEPHONE ENCOUNTER
Per Dr Marc     CT shows inguinal hernia on both sides as suspected. I have set up with general surgery to discuss with him plan going forward. I can see him at any time to discuss if he wants.       Pt informed

## 2025-04-03 DIAGNOSIS — I10 ESSENTIAL HYPERTENSION: ICD-10-CM

## 2025-04-04 RX ORDER — AMLODIPINE BESYLATE 5 MG/1
5 TABLET ORAL 2 TIMES DAILY
Qty: 180 TABLET | Refills: 0 | Status: SHIPPED | OUTPATIENT
Start: 2025-04-04

## 2025-04-04 NOTE — TELEPHONE ENCOUNTER
Name of Medication(s) Requested:  Requested Prescriptions     Pending Prescriptions Disp Refills    amLODIPine (NORVASC) 5 MG tablet [Pharmacy Med Name: amLODIPine Besylate 5 MG Oral Tablet] 180 tablet 3     Sig: TAKE 1 TABLET BY MOUTH IN THE  MORNING AND AT BEDTIME       Medication is on current medication list Yes    Dosage and directions were verified? Yes    Quantity verified: 90 day supply     Pharmacy Verified?  Yes    Last Appointment:  5/10/2024    Future appts:  No future appointments.     (If no appt send self scheduling link. .REFILLAPPT)  Scheduling request sent?     [] Yes  [x] No    Does patient need updated?  [] Yes  [x] No

## 2025-04-14 ENCOUNTER — OFFICE VISIT (OUTPATIENT)
Dept: FAMILY MEDICINE CLINIC | Age: 89
End: 2025-04-14
Payer: MEDICARE

## 2025-04-14 VITALS
WEIGHT: 192 LBS | BODY MASS INDEX: 27.49 KG/M2 | OXYGEN SATURATION: 96 % | RESPIRATION RATE: 16 BRPM | SYSTOLIC BLOOD PRESSURE: 128 MMHG | HEART RATE: 63 BPM | DIASTOLIC BLOOD PRESSURE: 80 MMHG | HEIGHT: 70 IN

## 2025-04-14 DIAGNOSIS — R93.3 ABNORMAL CT SCAN, ESOPHAGUS: ICD-10-CM

## 2025-04-14 DIAGNOSIS — K40.20 BILATERAL INGUINAL HERNIA WITHOUT OBSTRUCTION OR GANGRENE, RECURRENCE NOT SPECIFIED: Primary | ICD-10-CM

## 2025-04-14 DIAGNOSIS — K80.20 CALCULUS OF GALLBLADDER WITHOUT CHOLECYSTITIS WITHOUT OBSTRUCTION: ICD-10-CM

## 2025-04-14 PROBLEM — H34.8320 BRANCH RETINAL VEIN OCCLUSION OF LEFT EYE WITH MACULAR EDEMA (HCC): Status: RESOLVED | Noted: 2021-05-06 | Resolved: 2025-04-14

## 2025-04-14 PROCEDURE — 1036F TOBACCO NON-USER: CPT | Performed by: FAMILY MEDICINE

## 2025-04-14 PROCEDURE — G8427 DOCREV CUR MEDS BY ELIG CLIN: HCPCS | Performed by: FAMILY MEDICINE

## 2025-04-14 PROCEDURE — 1123F ACP DISCUSS/DSCN MKR DOCD: CPT | Performed by: FAMILY MEDICINE

## 2025-04-14 PROCEDURE — G8419 CALC BMI OUT NRM PARAM NOF/U: HCPCS | Performed by: FAMILY MEDICINE

## 2025-04-14 PROCEDURE — 99214 OFFICE O/P EST MOD 30 MIN: CPT | Performed by: FAMILY MEDICINE

## 2025-04-14 NOTE — PROGRESS NOTES
Edward Patton is a 89 y.o. male  .  Subjective:      Had surgery for bilateral hernia inguinal 10 years ago. Now has return of new hernia bilaterally.  Patient already has an appointment for surgeon.  We also consult surgery for other abnormalities that are seen on CT including gallstones and some thickness to the esophageal area on the CT.  Patient is having no right upper quadrant pain and is not bothered by fatty foods.  Patient was most likely need an upper endoscopy as well.  Will see what surgery has to say.  Will see if the risks of surgery are outweighed by benefits.  Patient was brought back in today to discuss his CT at length.  This was a 30-minute visit just discussing case.  Again patient has noticed no symptoms.        Review of Systems   Constitutional:  Negative for activity change, appetite change, chills, diaphoresis, fatigue, fever and unexpected weight change.   HENT:  Negative for congestion, dental problem, drooling, ear discharge, ear pain, facial swelling, hearing loss, mouth sores, nosebleeds, postnasal drip, rhinorrhea, sinus pressure, sneezing, sore throat, tinnitus, trouble swallowing and voice change.    Eyes:  Negative for photophobia, pain, discharge, redness, itching and visual disturbance.   Respiratory:  Negative for apnea, cough, choking, chest tightness, shortness of breath, wheezing and stridor.    Cardiovascular:  Negative for chest pain, palpitations and leg swelling.   Gastrointestinal:  Negative for abdominal distention, abdominal pain, anal bleeding, blood in stool, constipation, diarrhea, nausea, rectal pain and vomiting.   Endocrine: Negative for cold intolerance, heat intolerance, polydipsia, polyphagia and polyuria.   Genitourinary:  Negative for decreased urine volume, difficulty urinating, dysuria, enuresis, flank pain, frequency, genital sores, hematuria, penile discharge, penile pain, penile swelling, scrotal swelling, testicular pain and urgency.   Musculoskeletal:

## 2025-04-16 ENCOUNTER — TELEPHONE (OUTPATIENT)
Dept: SURGERY | Age: 89
End: 2025-04-16

## 2025-04-16 ENCOUNTER — INITIAL CONSULT (OUTPATIENT)
Dept: SURGERY | Age: 89
End: 2025-04-16
Payer: MEDICARE

## 2025-04-16 VITALS
DIASTOLIC BLOOD PRESSURE: 85 MMHG | HEART RATE: 74 BPM | RESPIRATION RATE: 18 BRPM | HEIGHT: 70 IN | TEMPERATURE: 98.3 F | BODY MASS INDEX: 27.49 KG/M2 | WEIGHT: 192 LBS | SYSTOLIC BLOOD PRESSURE: 131 MMHG

## 2025-04-16 DIAGNOSIS — K80.20 GALLSTONES: ICD-10-CM

## 2025-04-16 DIAGNOSIS — K40.90 RIGHT INGUINAL HERNIA: ICD-10-CM

## 2025-04-16 DIAGNOSIS — K40.21 BILATERAL RECURRENT INGUINAL HERNIA WITHOUT OBSTRUCTION OR GANGRENE: Primary | ICD-10-CM

## 2025-04-16 PROCEDURE — G8427 DOCREV CUR MEDS BY ELIG CLIN: HCPCS | Performed by: SURGERY

## 2025-04-16 PROCEDURE — G8419 CALC BMI OUT NRM PARAM NOF/U: HCPCS | Performed by: SURGERY

## 2025-04-16 PROCEDURE — 1123F ACP DISCUSS/DSCN MKR DOCD: CPT | Performed by: SURGERY

## 2025-04-16 PROCEDURE — 99203 OFFICE O/P NEW LOW 30 MIN: CPT | Performed by: SURGERY

## 2025-04-16 PROCEDURE — 1036F TOBACCO NON-USER: CPT | Performed by: SURGERY

## 2025-04-16 ASSESSMENT — ENCOUNTER SYMPTOMS
CHOKING: 0
CHEST TIGHTNESS: 0
RECTAL PAIN: 0
WHEEZING: 0
ANAL BLEEDING: 0
VOICE CHANGE: 0
RHINORRHEA: 0
COLOR CHANGE: 0
CONSTIPATION: 0
ABDOMINAL PAIN: 0
NAUSEA: 0
ABDOMINAL DISTENTION: 0
EYE REDNESS: 0
BACK PAIN: 0
SHORTNESS OF BREATH: 0
TROUBLE SWALLOWING: 0
APNEA: 0
BLOOD IN STOOL: 0
EYE ITCHING: 0
FACIAL SWELLING: 0
DIARRHEA: 0
COUGH: 0
PHOTOPHOBIA: 0
SORE THROAT: 0
VOMITING: 0
EYE PAIN: 0
SINUS PRESSURE: 0
STRIDOR: 0
EYE DISCHARGE: 0

## 2025-04-16 NOTE — TELEPHONE ENCOUNTER
Per Dr. Bravo, patient scheduled for Laparoscopic robotic right inguinal hernia repair with mesh possible bilateral at [facility] on 5/27/25. Surgery scheduled via [facility], surgeon's calendar updated. Follow up appointment scheduled. Dr. Bravo to enter orders.   Medical clearance requested from Dr. Marc.  Electronically signed by Radha Costa RN on 4/16/2025 at 11:33 AM
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
## 2025-05-15 ENCOUNTER — TELEPHONE (OUTPATIENT)
Dept: SURGERY | Age: 89
End: 2025-05-15

## 2025-05-15 NOTE — TELEPHONE ENCOUNTER
Received call from TANYA that patient wanted to cancel 5/27/25 R. Inguinal Hernia repair surgery. Call transferred to office confirmed with patient. He is wanting to go to [place]. Notified surgery scheduling and cancelled post op appointment.

## 2025-06-05 DIAGNOSIS — I10 ESSENTIAL HYPERTENSION: ICD-10-CM

## 2025-06-07 RX ORDER — AMLODIPINE BESYLATE 5 MG/1
5 TABLET ORAL 2 TIMES DAILY
Qty: 180 TABLET | Refills: 3 | Status: SHIPPED | OUTPATIENT
Start: 2025-06-07